# Patient Record
Sex: MALE | Race: AMERICAN INDIAN OR ALASKA NATIVE | URBAN - METROPOLITAN AREA
[De-identification: names, ages, dates, MRNs, and addresses within clinical notes are randomized per-mention and may not be internally consistent; named-entity substitution may affect disease eponyms.]

---

## 2023-08-28 ENCOUNTER — APPOINTMENT (OUTPATIENT)
Dept: RADIOLOGY | Facility: MEDICAL CENTER | Age: 58
End: 2023-08-28
Attending: EMERGENCY MEDICINE
Payer: MEDICARE

## 2023-08-28 ENCOUNTER — HOSPITAL ENCOUNTER (INPATIENT)
Facility: MEDICAL CENTER | Age: 58
LOS: 4 days | DRG: 246 | End: 2023-09-01
Attending: EMERGENCY MEDICINE | Admitting: INTERNAL MEDICINE
Payer: MEDICARE

## 2023-08-28 DIAGNOSIS — Z79.4 TYPE 2 DIABETES MELLITUS WITH HYPERGLYCEMIA, WITH LONG-TERM CURRENT USE OF INSULIN (HCC): ICD-10-CM

## 2023-08-28 DIAGNOSIS — I25.110 CORONARY ARTERY DISEASE INVOLVING NATIVE CORONARY ARTERY OF NATIVE HEART WITH UNSTABLE ANGINA PECTORIS (HCC): ICD-10-CM

## 2023-08-28 DIAGNOSIS — E11.65 TYPE 2 DIABETES MELLITUS WITH HYPERGLYCEMIA, WITH LONG-TERM CURRENT USE OF INSULIN (HCC): ICD-10-CM

## 2023-08-28 DIAGNOSIS — I46.9 CARDIAC ARREST (HCC): ICD-10-CM

## 2023-08-28 DIAGNOSIS — R07.89 CHEST WALL PAIN: ICD-10-CM

## 2023-08-28 DIAGNOSIS — I24.9 ACS (ACUTE CORONARY SYNDROME) (HCC): ICD-10-CM

## 2023-08-28 DIAGNOSIS — I50.20 HFREF (HEART FAILURE WITH REDUCED EJECTION FRACTION) (HCC): ICD-10-CM

## 2023-08-28 PROBLEM — R74.01 TRANSAMINITIS: Status: ACTIVE | Noted: 2023-08-28

## 2023-08-28 PROBLEM — E11.9 DIABETES (HCC): Status: ACTIVE | Noted: 2023-08-28

## 2023-08-28 PROBLEM — J96.90 RESPIRATORY FAILURE REQUIRING INTUBATION (HCC): Status: ACTIVE | Noted: 2023-08-28

## 2023-08-28 PROBLEM — N17.9 AKI (ACUTE KIDNEY INJURY) (HCC): Status: ACTIVE | Noted: 2023-08-28

## 2023-08-28 PROBLEM — E87.5 HYPERKALEMIA: Status: ACTIVE | Noted: 2023-08-28

## 2023-08-28 PROBLEM — E87.20 ACIDOSIS: Status: ACTIVE | Noted: 2023-08-28

## 2023-08-28 LAB
ALBUMIN SERPL BCP-MCNC: 3.4 G/DL (ref 3.2–4.9)
ALBUMIN/GLOB SERPL: 1.1 G/DL
ALP SERPL-CCNC: 89 U/L (ref 30–99)
ALT SERPL-CCNC: 278 U/L (ref 2–50)
ANION GAP SERPL CALC-SCNC: 14 MMOL/L (ref 7–16)
APTT PPP: 92.8 SEC (ref 24.7–36)
AST SERPL-CCNC: 335 U/L (ref 12–45)
BASE EXCESS BLDA CALC-SCNC: -13 MMOL/L (ref -4–3)
BASOPHILS # BLD AUTO: 0.9 % (ref 0–1.8)
BASOPHILS # BLD: 0.26 K/UL (ref 0–0.12)
BILIRUB SERPL-MCNC: 0.2 MG/DL (ref 0.1–1.5)
BODY TEMPERATURE: ABNORMAL DEGREES
BREATHS SETTING VENT: 22
BUN SERPL-MCNC: 25 MG/DL (ref 8–22)
BURR CELLS BLD QL SMEAR: NORMAL
CALCIUM ALBUM COR SERPL-MCNC: 8.3 MG/DL (ref 8.5–10.5)
CALCIUM SERPL-MCNC: 7.8 MG/DL (ref 8.5–10.5)
CHLORIDE SERPL-SCNC: 107 MMOL/L (ref 96–112)
CO2 BLDA-SCNC: 19 MMOL/L (ref 20–33)
CO2 SERPL-SCNC: 12 MMOL/L (ref 20–33)
CREAT SERPL-MCNC: 1.64 MG/DL (ref 0.5–1.4)
DELSYS IDSYS: ABNORMAL
EKG IMPRESSION: NORMAL
EOSINOPHIL # BLD AUTO: 0 K/UL (ref 0–0.51)
EOSINOPHIL NFR BLD: 0 % (ref 0–6.9)
ERYTHROCYTE [DISTWIDTH] IN BLOOD BY AUTOMATED COUNT: 44.6 FL (ref 35.9–50)
GFR SERPLBLD CREATININE-BSD FMLA CKD-EPI: 48 ML/MIN/1.73 M 2
GLOBULIN SER CALC-MCNC: 3 G/DL (ref 1.9–3.5)
GLUCOSE BLD STRIP.AUTO-MCNC: 304 MG/DL (ref 65–99)
GLUCOSE SERPL-MCNC: 313 MG/DL (ref 65–99)
HCO3 BLDA-SCNC: 17.6 MMOL/L (ref 17–25)
HCT VFR BLD AUTO: 52 % (ref 42–52)
HGB BLD-MCNC: 16.5 G/DL (ref 14–18)
HOROWITZ INDEX BLDA+IHG-RTO: 136 MM[HG]
INR PPP: 1.23 (ref 0.87–1.13)
LACTATE BLD-SCNC: 2.8 MMOL/L (ref 0.5–2)
LYMPHOCYTES # BLD AUTO: 1.24 K/UL (ref 1–4.8)
LYMPHOCYTES NFR BLD: 4.3 % (ref 22–41)
MANUAL DIFF BLD: NORMAL
MCH RBC QN AUTO: 30.7 PG (ref 27–33)
MCHC RBC AUTO-ENTMCNC: 31.7 G/DL (ref 32.3–36.5)
MCV RBC AUTO: 96.7 FL (ref 81.4–97.8)
MODE IMODE: ABNORMAL
MONOCYTES # BLD AUTO: 0.98 K/UL (ref 0–0.85)
MONOCYTES NFR BLD AUTO: 3.4 % (ref 0–13.4)
MORPHOLOGY BLD-IMP: NORMAL
NEUTROPHILS # BLD AUTO: 26.32 K/UL (ref 1.82–7.42)
NEUTROPHILS NFR BLD: 91.4 % (ref 44–72)
NRBC # BLD AUTO: 0 K/UL
NRBC BLD-RTO: 0 /100 WBC (ref 0–0.2)
O2/TOTAL GAS SETTING VFR VENT: 100 %
PCO2 BLDA: 58.6 MMHG (ref 26–37)
PEEP END EXPIRATORY PRESSURE IPEEP: 8 CMH20
PH BLDA: 7.08 [PH] (ref 7.4–7.5)
PLATELET # BLD AUTO: 400 K/UL (ref 164–446)
PLATELET BLD QL SMEAR: NORMAL
PMV BLD AUTO: 9.6 FL (ref 9–12.9)
PO2 BLDA: 136 MMHG (ref 64–87)
POIKILOCYTOSIS BLD QL SMEAR: NORMAL
POTASSIUM SERPL-SCNC: 7.5 MMOL/L (ref 3.6–5.5)
PROT SERPL-MCNC: 6.4 G/DL (ref 6–8.2)
PROTHROMBIN TIME: 15.7 SEC (ref 12–14.6)
RBC # BLD AUTO: 5.38 M/UL (ref 4.7–6.1)
RBC BLD AUTO: PRESENT
SAO2 % BLDA: 98 % (ref 93–99)
SODIUM SERPL-SCNC: 133 MMOL/L (ref 135–145)
SPECIMEN DRAWN FROM PATIENT: ABNORMAL
TIDAL VOLUME IVT: 490 ML
TROPONIN T SERPL-MCNC: 2564 NG/L (ref 6–19)
WBC # BLD AUTO: 28.8 K/UL (ref 4.8–10.8)
WBC TOXIC VACUOLES BLD QL SMEAR: NORMAL

## 2023-08-28 PROCEDURE — 96375 TX/PRO/DX INJ NEW DRUG ADDON: CPT

## 2023-08-28 PROCEDURE — 36600 WITHDRAWAL OF ARTERIAL BLOOD: CPT

## 2023-08-28 PROCEDURE — 85610 PROTHROMBIN TIME: CPT | Mod: 91

## 2023-08-28 PROCEDURE — 82962 GLUCOSE BLOOD TEST: CPT | Mod: 91

## 2023-08-28 PROCEDURE — 96365 THER/PROPH/DIAG IV INF INIT: CPT

## 2023-08-28 PROCEDURE — 700102 HCHG RX REV CODE 250 W/ 637 OVERRIDE(OP): Performed by: EMERGENCY MEDICINE

## 2023-08-28 PROCEDURE — 02HV33Z INSERTION OF INFUSION DEVICE INTO SUPERIOR VENA CAVA, PERCUTANEOUS APPROACH: ICD-10-PCS | Performed by: INTERNAL MEDICINE

## 2023-08-28 PROCEDURE — 700101 HCHG RX REV CODE 250: Performed by: EMERGENCY MEDICINE

## 2023-08-28 PROCEDURE — 700101 HCHG RX REV CODE 250: Performed by: INTERNAL MEDICINE

## 2023-08-28 PROCEDURE — 85730 THROMBOPLASTIN TIME PARTIAL: CPT

## 2023-08-28 PROCEDURE — 700111 HCHG RX REV CODE 636 W/ 250 OVERRIDE (IP): Mod: JZ | Performed by: EMERGENCY MEDICINE

## 2023-08-28 PROCEDURE — 94003 VENT MGMT INPAT SUBQ DAY: CPT

## 2023-08-28 PROCEDURE — 99223 1ST HOSP IP/OBS HIGH 75: CPT | Performed by: INTERNAL MEDICINE

## 2023-08-28 PROCEDURE — 770022 HCHG ROOM/CARE - ICU (200)

## 2023-08-28 PROCEDURE — 99291 CRITICAL CARE FIRST HOUR: CPT

## 2023-08-28 PROCEDURE — 99291 CRITICAL CARE FIRST HOUR: CPT | Mod: GC | Performed by: INTERNAL MEDICINE

## 2023-08-28 PROCEDURE — 85007 BL SMEAR W/DIFF WBC COUNT: CPT

## 2023-08-28 PROCEDURE — 700105 HCHG RX REV CODE 258: Performed by: INTERNAL MEDICINE

## 2023-08-28 PROCEDURE — 85025 COMPLETE CBC W/AUTO DIFF WBC: CPT

## 2023-08-28 PROCEDURE — 84484 ASSAY OF TROPONIN QUANT: CPT

## 2023-08-28 PROCEDURE — 93005 ELECTROCARDIOGRAM TRACING: CPT | Performed by: EMERGENCY MEDICINE

## 2023-08-28 PROCEDURE — 5A1935Z RESPIRATORY VENTILATION, LESS THAN 24 CONSECUTIVE HOURS: ICD-10-PCS | Performed by: INTERNAL MEDICINE

## 2023-08-28 PROCEDURE — 80053 COMPREHEN METABOLIC PANEL: CPT

## 2023-08-28 PROCEDURE — 96366 THER/PROPH/DIAG IV INF ADDON: CPT

## 2023-08-28 PROCEDURE — 36556 INSERT NON-TUNNEL CV CATH: CPT

## 2023-08-28 PROCEDURE — 85520 HEPARIN ASSAY: CPT

## 2023-08-28 PROCEDURE — 71045 X-RAY EXAM CHEST 1 VIEW: CPT

## 2023-08-28 PROCEDURE — 36415 COLL VENOUS BLD VENIPUNCTURE: CPT

## 2023-08-28 PROCEDURE — 83605 ASSAY OF LACTIC ACID: CPT

## 2023-08-28 PROCEDURE — C1751 CATH, INF, PER/CENT/MIDLINE: HCPCS

## 2023-08-28 PROCEDURE — 700111 HCHG RX REV CODE 636 W/ 250 OVERRIDE (IP): Mod: JZ | Performed by: INTERNAL MEDICINE

## 2023-08-28 PROCEDURE — 700105 HCHG RX REV CODE 258: Performed by: EMERGENCY MEDICINE

## 2023-08-28 PROCEDURE — 96368 THER/DIAG CONCURRENT INF: CPT

## 2023-08-28 PROCEDURE — 94002 VENT MGMT INPAT INIT DAY: CPT

## 2023-08-28 PROCEDURE — 82803 BLOOD GASES ANY COMBINATION: CPT

## 2023-08-28 RX ORDER — HEPARIN SODIUM 1000 [USP'U]/ML
2000 INJECTION, SOLUTION INTRAVENOUS; SUBCUTANEOUS PRN
Status: DISCONTINUED | OUTPATIENT
Start: 2023-08-28 | End: 2023-08-29

## 2023-08-28 RX ORDER — FAMOTIDINE 20 MG/1
20 TABLET, FILM COATED ORAL EVERY 12 HOURS
Status: DISCONTINUED | OUTPATIENT
Start: 2023-08-28 | End: 2023-08-29

## 2023-08-28 RX ORDER — DEXMEDETOMIDINE HYDROCHLORIDE 4 UG/ML
0-1.5 INJECTION, SOLUTION INTRAVENOUS CONTINUOUS
Status: DISCONTINUED | OUTPATIENT
Start: 2023-08-28 | End: 2023-08-29

## 2023-08-28 RX ORDER — NOREPINEPHRINE BITARTRATE 0.03 MG/ML
0-1 INJECTION, SOLUTION INTRAVENOUS CONTINUOUS
Status: DISCONTINUED | OUTPATIENT
Start: 2023-08-28 | End: 2023-08-30

## 2023-08-28 RX ORDER — SODIUM CHLORIDE, SODIUM LACTATE, POTASSIUM CHLORIDE, AND CALCIUM CHLORIDE .6; .31; .03; .02 G/100ML; G/100ML; G/100ML; G/100ML
1000 INJECTION, SOLUTION INTRAVENOUS ONCE
Status: COMPLETED | OUTPATIENT
Start: 2023-08-28 | End: 2023-08-29

## 2023-08-28 RX ORDER — AMOXICILLIN 250 MG
2 CAPSULE ORAL 2 TIMES DAILY
Status: DISCONTINUED | OUTPATIENT
Start: 2023-08-28 | End: 2023-09-01 | Stop reason: HOSPADM

## 2023-08-28 RX ORDER — INSULIN LISPRO 100 [IU]/ML
1-6 INJECTION, SOLUTION INTRAVENOUS; SUBCUTANEOUS EVERY 6 HOURS
Status: DISCONTINUED | OUTPATIENT
Start: 2023-08-29 | End: 2023-08-29

## 2023-08-28 RX ORDER — HEPARIN SODIUM 5000 [USP'U]/100ML
0-30 INJECTION, SOLUTION INTRAVENOUS CONTINUOUS
Status: DISCONTINUED | OUTPATIENT
Start: 2023-08-28 | End: 2023-08-29

## 2023-08-28 RX ORDER — CALCIUM CHLORIDE 100 MG/ML
1 INJECTION INTRAVENOUS; INTRAVENTRICULAR ONCE
Status: COMPLETED | OUTPATIENT
Start: 2023-08-28 | End: 2023-08-28

## 2023-08-28 RX ORDER — HEPARIN SODIUM 1000 [USP'U]/ML
4000 INJECTION, SOLUTION INTRAVENOUS; SUBCUTANEOUS ONCE
Status: COMPLETED | OUTPATIENT
Start: 2023-08-28 | End: 2023-08-28

## 2023-08-28 RX ORDER — DEXTROSE MONOHYDRATE 50 MG/ML
INJECTION, SOLUTION INTRAVENOUS CONTINUOUS
Status: DISCONTINUED | OUTPATIENT
Start: 2023-08-28 | End: 2023-08-28

## 2023-08-28 RX ORDER — IPRATROPIUM BROMIDE AND ALBUTEROL SULFATE 2.5; .5 MG/3ML; MG/3ML
3 SOLUTION RESPIRATORY (INHALATION)
Status: DISCONTINUED | OUTPATIENT
Start: 2023-08-28 | End: 2023-09-01 | Stop reason: HOSPADM

## 2023-08-28 RX ORDER — BISACODYL 10 MG
10 SUPPOSITORY, RECTAL RECTAL
Status: DISCONTINUED | OUTPATIENT
Start: 2023-08-28 | End: 2023-09-01 | Stop reason: HOSPADM

## 2023-08-28 RX ORDER — DEXTROSE MONOHYDRATE 25 G/50ML
12.5 INJECTION, SOLUTION INTRAVENOUS ONCE
Status: COMPLETED | OUTPATIENT
Start: 2023-08-28 | End: 2023-08-28

## 2023-08-28 RX ORDER — POLYETHYLENE GLYCOL 3350 17 G/17G
1 POWDER, FOR SOLUTION ORAL
Status: DISCONTINUED | OUTPATIENT
Start: 2023-08-28 | End: 2023-09-01 | Stop reason: HOSPADM

## 2023-08-28 RX ADMIN — DEXTROSE MONOHYDRATE 12.5 G: 25 INJECTION, SOLUTION INTRAVENOUS at 22:32

## 2023-08-28 RX ADMIN — NOREPINEPHRINE BITARTRATE 0.05 MCG/KG/MIN: 1 INJECTION, SOLUTION, CONCENTRATE INTRAVENOUS at 19:31

## 2023-08-28 RX ADMIN — HEPARIN SODIUM 4000 UNITS: 1000 INJECTION, SOLUTION INTRAVENOUS; SUBCUTANEOUS at 23:36

## 2023-08-28 RX ADMIN — CALCIUM CHLORIDE 1 G: 100 INJECTION INTRAVENOUS; INTRAVENTRICULAR at 22:32

## 2023-08-28 RX ADMIN — HEPARIN SODIUM 12 UNITS/KG/HR: 5000 INJECTION, SOLUTION INTRAVENOUS at 23:38

## 2023-08-28 RX ADMIN — INSULIN HUMAN 10.5 UNITS: 100 INJECTION, SOLUTION PARENTERAL at 22:40

## 2023-08-28 RX ADMIN — DEXMEDETOMIDINE 0.2 MCG/KG/HR: 100 INJECTION, SOLUTION INTRAVENOUS at 23:24

## 2023-08-28 RX ADMIN — FAMOTIDINE 20 MG: 10 INJECTION, SOLUTION INTRAVENOUS at 23:58

## 2023-08-28 RX ADMIN — FENTANYL CITRATE 50 MCG: 50 INJECTION, SOLUTION INTRAMUSCULAR; INTRAVENOUS at 23:05

## 2023-08-28 RX ADMIN — SODIUM CHLORIDE, POTASSIUM CHLORIDE, SODIUM LACTATE AND CALCIUM CHLORIDE 1000 ML: 600; 310; 30; 20 INJECTION, SOLUTION INTRAVENOUS at 23:55

## 2023-08-28 RX ADMIN — AMIODARONE HYDROCHLORIDE 1 MG/MIN: 1.8 INJECTION, SOLUTION INTRAVENOUS at 19:31

## 2023-08-28 RX ADMIN — SODIUM BICARBONATE 100 MEQ: 84 INJECTION INTRAVENOUS at 20:16

## 2023-08-28 ASSESSMENT — PAIN DESCRIPTION - PAIN TYPE
TYPE: ACUTE PAIN

## 2023-08-28 ASSESSMENT — FIBROSIS 4 INDEX: FIB4 SCORE: 2.91

## 2023-08-29 ENCOUNTER — APPOINTMENT (OUTPATIENT)
Dept: CARDIOLOGY | Facility: MEDICAL CENTER | Age: 58
End: 2023-08-29
Attending: PHYSICIAN ASSISTANT
Payer: MEDICARE

## 2023-08-29 ENCOUNTER — APPOINTMENT (OUTPATIENT)
Dept: RADIOLOGY | Facility: MEDICAL CENTER | Age: 58
End: 2023-08-29
Attending: INTERNAL MEDICINE
Payer: MEDICARE

## 2023-08-29 ENCOUNTER — HOSPITAL ENCOUNTER (OUTPATIENT)
Dept: RADIOLOGY | Facility: MEDICAL CENTER | Age: 58
End: 2023-08-29
Attending: INTERNAL MEDICINE

## 2023-08-29 PROBLEM — E87.29 HIGH ANION GAP METABOLIC ACIDOSIS: Status: ACTIVE | Noted: 2023-08-28

## 2023-08-29 PROBLEM — I24.9 ACS (ACUTE CORONARY SYNDROME) (HCC): Status: ACTIVE | Noted: 2023-08-29

## 2023-08-29 PROBLEM — I50.20 HFREF (HEART FAILURE WITH REDUCED EJECTION FRACTION) (HCC): Status: ACTIVE | Noted: 2023-08-29

## 2023-08-29 PROBLEM — I49.01 CARDIAC ARREST WITH VENTRICULAR FIBRILLATION (HCC): Status: ACTIVE | Noted: 2023-08-28

## 2023-08-29 PROBLEM — E11.65 TYPE 2 DIABETES MELLITUS WITH HYPERGLYCEMIA, WITHOUT LONG-TERM CURRENT USE OF INSULIN (HCC): Status: ACTIVE | Noted: 2023-08-28

## 2023-08-29 LAB
ACT BLD: 245 SEC (ref 74–137)
ACT BLD: 281 SEC (ref 74–137)
ALBUMIN SERPL BCP-MCNC: 3.1 G/DL (ref 3.2–4.9)
ALBUMIN/GLOB SERPL: 1.3 G/DL
ALP SERPL-CCNC: 69 U/L (ref 30–99)
ALT SERPL-CCNC: 198 U/L (ref 2–50)
ANION GAP SERPL CALC-SCNC: 13 MMOL/L (ref 7–16)
ANION GAP SERPL CALC-SCNC: 13 MMOL/L (ref 7–16)
ANION GAP SERPL CALC-SCNC: 15 MMOL/L (ref 7–16)
APTT PPP: 29.9 SEC (ref 24.7–36)
AST SERPL-CCNC: 232 U/L (ref 12–45)
BASE EXCESS BLDA CALC-SCNC: -10 MMOL/L (ref -4–3)
BASOPHILS # BLD AUTO: 0.1 % (ref 0–1.8)
BASOPHILS # BLD: 0.02 K/UL (ref 0–0.12)
BILIRUB SERPL-MCNC: 0.2 MG/DL (ref 0.1–1.5)
BODY TEMPERATURE: ABNORMAL DEGREES
BREATHS SETTING VENT: 28
BUN SERPL-MCNC: 28 MG/DL (ref 8–22)
BUN SERPL-MCNC: 32 MG/DL (ref 8–22)
BUN SERPL-MCNC: 33 MG/DL (ref 8–22)
CALCIUM ALBUM COR SERPL-MCNC: 9.6 MG/DL (ref 8.5–10.5)
CALCIUM SERPL-MCNC: 8.5 MG/DL (ref 8.5–10.5)
CALCIUM SERPL-MCNC: 8.8 MG/DL (ref 8.5–10.5)
CALCIUM SERPL-MCNC: 8.9 MG/DL (ref 8.5–10.5)
CHLORIDE SERPL-SCNC: 106 MMOL/L (ref 96–112)
CHLORIDE SERPL-SCNC: 108 MMOL/L (ref 96–112)
CHLORIDE SERPL-SCNC: 108 MMOL/L (ref 96–112)
CO2 BLDA-SCNC: 18 MMOL/L (ref 20–33)
CO2 SERPL-SCNC: 16 MMOL/L (ref 20–33)
CO2 SERPL-SCNC: 16 MMOL/L (ref 20–33)
CO2 SERPL-SCNC: 19 MMOL/L (ref 20–33)
CREAT SERPL-MCNC: 1.84 MG/DL (ref 0.5–1.4)
CREAT SERPL-MCNC: 2.11 MG/DL (ref 0.5–1.4)
CREAT SERPL-MCNC: 2.29 MG/DL (ref 0.5–1.4)
DELSYS IDSYS: ABNORMAL
EKG IMPRESSION: NORMAL
END TIDAL CARBON DIOXIDE IECO2: 26 MMHG
EOSINOPHIL # BLD AUTO: 0 K/UL (ref 0–0.51)
EOSINOPHIL NFR BLD: 0 % (ref 0–6.9)
ERYTHROCYTE [DISTWIDTH] IN BLOOD BY AUTOMATED COUNT: 43.9 FL (ref 35.9–50)
EST. AVERAGE GLUCOSE BLD GHB EST-MCNC: 212 MG/DL
GFR SERPLBLD CREATININE-BSD FMLA CKD-EPI: 32 ML/MIN/1.73 M 2
GFR SERPLBLD CREATININE-BSD FMLA CKD-EPI: 36 ML/MIN/1.73 M 2
GFR SERPLBLD CREATININE-BSD FMLA CKD-EPI: 42 ML/MIN/1.73 M 2
GLOBULIN SER CALC-MCNC: 2.4 G/DL (ref 1.9–3.5)
GLUCOSE BLD STRIP.AUTO-MCNC: 142 MG/DL (ref 65–99)
GLUCOSE BLD STRIP.AUTO-MCNC: 168 MG/DL (ref 65–99)
GLUCOSE BLD STRIP.AUTO-MCNC: 223 MG/DL (ref 65–99)
GLUCOSE BLD STRIP.AUTO-MCNC: 257 MG/DL (ref 65–99)
GLUCOSE BLD STRIP.AUTO-MCNC: 275 MG/DL (ref 65–99)
GLUCOSE BLD STRIP.AUTO-MCNC: 286 MG/DL (ref 65–99)
GLUCOSE BLD STRIP.AUTO-MCNC: 330 MG/DL (ref 65–99)
GLUCOSE SERPL-MCNC: 259 MG/DL (ref 65–99)
GLUCOSE SERPL-MCNC: 281 MG/DL (ref 65–99)
GLUCOSE SERPL-MCNC: 292 MG/DL (ref 65–99)
HBA1C MFR BLD: 9 % (ref 4–5.6)
HCO3 BLDA-SCNC: 17.3 MMOL/L (ref 17–25)
HCT VFR BLD AUTO: 43.2 % (ref 42–52)
HGB BLD-MCNC: 14.6 G/DL (ref 14–18)
HOROWITZ INDEX BLDA+IHG-RTO: 334 MM[HG]
IMM GRANULOCYTES # BLD AUTO: 0.14 K/UL (ref 0–0.11)
IMM GRANULOCYTES NFR BLD AUTO: 0.9 % (ref 0–0.9)
INR PPP: 1.26 (ref 0.87–1.13)
LACTATE SERPL-SCNC: 1.4 MMOL/L (ref 0.5–2)
LACTATE SERPL-SCNC: 1.6 MMOL/L (ref 0.5–2)
LACTATE SERPL-SCNC: 2.2 MMOL/L (ref 0.5–2)
LACTATE SERPL-SCNC: 5.2 MMOL/L (ref 0.5–2)
LV EJECT FRACT  99904: 42
LV EJECT FRACT MOD 2C 99903: 36.98
LV EJECT FRACT MOD 4C 99902: 47.93
LV EJECT FRACT MOD BP 99901: 42.44
LYMPHOCYTES # BLD AUTO: 1.34 K/UL (ref 1–4.8)
LYMPHOCYTES NFR BLD: 8.2 % (ref 22–41)
MAGNESIUM SERPL-MCNC: 1.5 MG/DL (ref 1.5–2.5)
MCH RBC QN AUTO: 31.2 PG (ref 27–33)
MCHC RBC AUTO-ENTMCNC: 33.8 G/DL (ref 32.3–36.5)
MCV RBC AUTO: 92.3 FL (ref 81.4–97.8)
MODE IMODE: ABNORMAL
MONOCYTES # BLD AUTO: 1.06 K/UL (ref 0–0.85)
MONOCYTES NFR BLD AUTO: 6.4 % (ref 0–13.4)
NEUTROPHILS # BLD AUTO: 13.88 K/UL (ref 1.82–7.42)
NEUTROPHILS NFR BLD: 84.4 % (ref 44–72)
NRBC # BLD AUTO: 0 K/UL
NRBC BLD-RTO: 0 /100 WBC (ref 0–0.2)
O2/TOTAL GAS SETTING VFR VENT: 80 %
PCO2 BLDA: 39.7 MMHG (ref 26–37)
PCO2 TEMP ADJ BLDA: 41.1 MMHG (ref 26–37)
PEEP END EXPIRATORY PRESSURE IPEEP: 8 CMH20
PH BLDA: 7.25 [PH] (ref 7.4–7.5)
PH TEMP ADJ BLDA: 7.24 [PH] (ref 7.4–7.5)
PHOSPHATE SERPL-MCNC: 3.7 MG/DL (ref 2.5–4.5)
PLATELET # BLD AUTO: 315 K/UL (ref 164–446)
PMV BLD AUTO: 9.8 FL (ref 9–12.9)
PO2 BLDA: 267 MMHG (ref 64–87)
PO2 TEMP ADJ BLDA: 271 MMHG (ref 64–87)
POTASSIUM SERPL-SCNC: 5.1 MMOL/L (ref 3.6–5.5)
POTASSIUM SERPL-SCNC: 5.3 MMOL/L (ref 3.6–5.5)
POTASSIUM SERPL-SCNC: 6.6 MMOL/L (ref 3.6–5.5)
PROT SERPL-MCNC: 5.5 G/DL (ref 6–8.2)
PROTHROMBIN TIME: 15.9 SEC (ref 12–14.6)
RBC # BLD AUTO: 4.68 M/UL (ref 4.7–6.1)
SAO2 % BLDA: 100 % (ref 93–99)
SODIUM SERPL-SCNC: 137 MMOL/L (ref 135–145)
SODIUM SERPL-SCNC: 137 MMOL/L (ref 135–145)
SODIUM SERPL-SCNC: 140 MMOL/L (ref 135–145)
SPECIMEN DRAWN FROM PATIENT: ABNORMAL
TIDAL VOLUME IVT: 570 ML
TROPONIN T SERPL-MCNC: 2763 NG/L (ref 6–19)
TROPONIN T SERPL-MCNC: 2817 NG/L (ref 6–19)
TROPONIN T SERPL-MCNC: 3401 NG/L (ref 6–19)
TROPONIN T SERPL-MCNC: 3505 NG/L (ref 6–19)
UFH PPP CHRO-ACNC: 0.41 IU/ML
UFH PPP CHRO-ACNC: 0.54 IU/ML
UFH PPP CHRO-ACNC: <0.1 IU/ML
WBC # BLD AUTO: 16.4 K/UL (ref 4.8–10.8)

## 2023-08-29 PROCEDURE — 93005 ELECTROCARDIOGRAM TRACING: CPT | Performed by: STUDENT IN AN ORGANIZED HEALTH CARE EDUCATION/TRAINING PROGRAM

## 2023-08-29 PROCEDURE — A9270 NON-COVERED ITEM OR SERVICE: HCPCS

## 2023-08-29 PROCEDURE — 83036 HEMOGLOBIN GLYCOSYLATED A1C: CPT

## 2023-08-29 PROCEDURE — 99291 CRITICAL CARE FIRST HOUR: CPT | Performed by: STUDENT IN AN ORGANIZED HEALTH CARE EDUCATION/TRAINING PROGRAM

## 2023-08-29 PROCEDURE — 93010 ELECTROCARDIOGRAM REPORT: CPT | Performed by: INTERNAL MEDICINE

## 2023-08-29 PROCEDURE — 700101 HCHG RX REV CODE 250: Performed by: STUDENT IN AN ORGANIZED HEALTH CARE EDUCATION/TRAINING PROGRAM

## 2023-08-29 PROCEDURE — 700111 HCHG RX REV CODE 636 W/ 250 OVERRIDE (IP): Mod: JZ | Performed by: INTERNAL MEDICINE

## 2023-08-29 PROCEDURE — 36600 WITHDRAWAL OF ARTERIAL BLOOD: CPT

## 2023-08-29 PROCEDURE — 92928 PRQ TCAT PLMT NTRAC ST 1 LES: CPT | Mod: RC | Performed by: INTERNAL MEDICINE

## 2023-08-29 PROCEDURE — 700102 HCHG RX REV CODE 250 W/ 637 OVERRIDE(OP)

## 2023-08-29 PROCEDURE — 93306 TTE W/DOPPLER COMPLETE: CPT | Mod: 26 | Performed by: INTERNAL MEDICINE

## 2023-08-29 PROCEDURE — 85025 COMPLETE CBC W/AUTO DIFF WBC: CPT

## 2023-08-29 PROCEDURE — 99152 MOD SED SAME PHYS/QHP 5/>YRS: CPT | Performed by: INTERNAL MEDICINE

## 2023-08-29 PROCEDURE — A9270 NON-COVERED ITEM OR SERVICE: HCPCS | Performed by: STUDENT IN AN ORGANIZED HEALTH CARE EDUCATION/TRAINING PROGRAM

## 2023-08-29 PROCEDURE — 700101 HCHG RX REV CODE 250: Performed by: EMERGENCY MEDICINE

## 2023-08-29 PROCEDURE — 700117 HCHG RX CONTRAST REV CODE 255: Performed by: INTERNAL MEDICINE

## 2023-08-29 PROCEDURE — 85347 COAGULATION TIME ACTIVATED: CPT | Mod: 91

## 2023-08-29 PROCEDURE — 83735 ASSAY OF MAGNESIUM: CPT

## 2023-08-29 PROCEDURE — 92610 EVALUATE SWALLOWING FUNCTION: CPT

## 2023-08-29 PROCEDURE — 84484 ASSAY OF TROPONIN QUANT: CPT | Mod: 91

## 2023-08-29 PROCEDURE — 94003 VENT MGMT INPAT SUBQ DAY: CPT

## 2023-08-29 PROCEDURE — 99153 MOD SED SAME PHYS/QHP EA: CPT

## 2023-08-29 PROCEDURE — 71045 X-RAY EXAM CHEST 1 VIEW: CPT

## 2023-08-29 PROCEDURE — 700111 HCHG RX REV CODE 636 W/ 250 OVERRIDE (IP): Mod: JZ | Performed by: EMERGENCY MEDICINE

## 2023-08-29 PROCEDURE — 82803 BLOOD GASES ANY COMBINATION: CPT

## 2023-08-29 PROCEDURE — B2111ZZ FLUOROSCOPY OF MULTIPLE CORONARY ARTERIES USING LOW OSMOLAR CONTRAST: ICD-10-PCS | Performed by: INTERNAL MEDICINE

## 2023-08-29 PROCEDURE — 700101 HCHG RX REV CODE 250

## 2023-08-29 PROCEDURE — 84100 ASSAY OF PHOSPHORUS: CPT

## 2023-08-29 PROCEDURE — 700101 HCHG RX REV CODE 250: Performed by: INTERNAL MEDICINE

## 2023-08-29 PROCEDURE — 770022 HCHG ROOM/CARE - ICU (200)

## 2023-08-29 PROCEDURE — 93306 TTE W/DOPPLER COMPLETE: CPT

## 2023-08-29 PROCEDURE — 82962 GLUCOSE BLOOD TEST: CPT | Mod: 91

## 2023-08-29 PROCEDURE — 80053 COMPREHEN METABOLIC PANEL: CPT

## 2023-08-29 PROCEDURE — 93005 ELECTROCARDIOGRAM TRACING: CPT | Performed by: INTERNAL MEDICINE

## 2023-08-29 PROCEDURE — 027236Z DILATION OF CORONARY ARTERY, THREE ARTERIES WITH THREE DRUG-ELUTING INTRALUMINAL DEVICES, PERCUTANEOUS APPROACH: ICD-10-PCS | Performed by: INTERNAL MEDICINE

## 2023-08-29 PROCEDURE — 700102 HCHG RX REV CODE 250 W/ 637 OVERRIDE(OP): Performed by: STUDENT IN AN ORGANIZED HEALTH CARE EDUCATION/TRAINING PROGRAM

## 2023-08-29 PROCEDURE — 700111 HCHG RX REV CODE 636 W/ 250 OVERRIDE (IP)

## 2023-08-29 PROCEDURE — 700105 HCHG RX REV CODE 258: Performed by: INTERNAL MEDICINE

## 2023-08-29 PROCEDURE — 700105 HCHG RX REV CODE 258: Performed by: EMERGENCY MEDICINE

## 2023-08-29 PROCEDURE — 99233 SBSQ HOSP IP/OBS HIGH 50: CPT | Mod: 25 | Performed by: INTERNAL MEDICINE

## 2023-08-29 PROCEDURE — 4A023N7 MEASUREMENT OF CARDIAC SAMPLING AND PRESSURE, LEFT HEART, PERCUTANEOUS APPROACH: ICD-10-PCS | Performed by: INTERNAL MEDICINE

## 2023-08-29 PROCEDURE — 85520 HEPARIN ASSAY: CPT

## 2023-08-29 PROCEDURE — 93458 L HRT ARTERY/VENTRICLE ANGIO: CPT | Mod: 26,59 | Performed by: INTERNAL MEDICINE

## 2023-08-29 PROCEDURE — 700111 HCHG RX REV CODE 636 W/ 250 OVERRIDE (IP): Mod: JZ

## 2023-08-29 PROCEDURE — 80048 BASIC METABOLIC PNL TOTAL CA: CPT | Mod: 91

## 2023-08-29 PROCEDURE — 700117 HCHG RX CONTRAST REV CODE 255: Performed by: PHYSICIAN ASSISTANT

## 2023-08-29 PROCEDURE — 83605 ASSAY OF LACTIC ACID: CPT | Mod: 91

## 2023-08-29 RX ORDER — ALBUTEROL SULFATE 90 UG/1
1-2 AEROSOL, METERED RESPIRATORY (INHALATION) EVERY 4 HOURS PRN
COMMUNITY

## 2023-08-29 RX ORDER — DEXTROSE MONOHYDRATE 25 G/50ML
25 INJECTION, SOLUTION INTRAVENOUS
Status: DISCONTINUED | OUTPATIENT
Start: 2023-08-29 | End: 2023-09-01 | Stop reason: HOSPADM

## 2023-08-29 RX ORDER — PRASUGREL 10 MG/1
10 TABLET, FILM COATED ORAL DAILY
Status: DISCONTINUED | OUTPATIENT
Start: 2023-08-30 | End: 2023-09-01 | Stop reason: HOSPADM

## 2023-08-29 RX ORDER — MEPERIDINE HYDROCHLORIDE 50 MG/ML
25 INJECTION INTRAMUSCULAR; INTRAVENOUS; SUBCUTANEOUS
Status: DISCONTINUED | OUTPATIENT
Start: 2023-08-29 | End: 2023-08-29

## 2023-08-29 RX ORDER — HEPARIN SODIUM 1000 [USP'U]/ML
INJECTION, SOLUTION INTRAVENOUS; SUBCUTANEOUS
Status: COMPLETED
Start: 2023-08-29 | End: 2023-08-29

## 2023-08-29 RX ORDER — VERAPAMIL HYDROCHLORIDE 2.5 MG/ML
INJECTION, SOLUTION INTRAVENOUS
Status: COMPLETED
Start: 2023-08-29 | End: 2023-08-29

## 2023-08-29 RX ORDER — GABAPENTIN 300 MG/1
900 CAPSULE ORAL 2 TIMES DAILY
COMMUNITY

## 2023-08-29 RX ORDER — MAGNESIUM SULFATE HEPTAHYDRATE 40 MG/ML
.5-2 INJECTION, SOLUTION INTRAVENOUS CONTINUOUS
Status: DISCONTINUED | OUTPATIENT
Start: 2023-08-29 | End: 2023-08-29

## 2023-08-29 RX ORDER — HEPARIN SODIUM 200 [USP'U]/100ML
INJECTION, SOLUTION INTRAVENOUS
Status: COMPLETED
Start: 2023-08-29 | End: 2023-08-29

## 2023-08-29 RX ORDER — DEXTROSE MONOHYDRATE 25 G/50ML
25 INJECTION, SOLUTION INTRAVENOUS
Status: DISCONTINUED | OUTPATIENT
Start: 2023-08-29 | End: 2023-08-29

## 2023-08-29 RX ORDER — ASPIRIN 81 MG/1
81 TABLET, CHEWABLE ORAL DAILY
Status: DISCONTINUED | OUTPATIENT
Start: 2023-08-30 | End: 2023-09-01 | Stop reason: HOSPADM

## 2023-08-29 RX ORDER — OXYCODONE HYDROCHLORIDE 5 MG/1
5 TABLET ORAL EVERY 4 HOURS PRN
Status: DISCONTINUED | OUTPATIENT
Start: 2023-08-29 | End: 2023-08-30

## 2023-08-29 RX ORDER — HEPARIN SODIUM 200 [USP'U]/100ML
INJECTION, SOLUTION INTRAVENOUS
Status: DISCONTINUED
Start: 2023-08-29 | End: 2023-08-30 | Stop reason: HOSPADM

## 2023-08-29 RX ORDER — MIDAZOLAM HYDROCHLORIDE 1 MG/ML
INJECTION INTRAMUSCULAR; INTRAVENOUS
Status: COMPLETED
Start: 2023-08-29 | End: 2023-08-29

## 2023-08-29 RX ORDER — LIDOCAINE HYDROCHLORIDE 20 MG/ML
INJECTION, SOLUTION INFILTRATION; PERINEURAL
Status: COMPLETED
Start: 2023-08-29 | End: 2023-08-29

## 2023-08-29 RX ORDER — PRASUGREL 10 MG/1
60 TABLET, FILM COATED ORAL ONCE
Status: DISCONTINUED | OUTPATIENT
Start: 2023-08-29 | End: 2023-08-29

## 2023-08-29 RX ORDER — HEPARIN SODIUM 1000 [USP'U]/ML
INJECTION, SOLUTION INTRAVENOUS; SUBCUTANEOUS
Status: DISCONTINUED
Start: 2023-08-29 | End: 2023-08-30 | Stop reason: HOSPADM

## 2023-08-29 RX ORDER — LIDOCAINE 50 MG/G
1 PATCH TOPICAL EVERY 24 HOURS
Status: DISCONTINUED | OUTPATIENT
Start: 2023-08-29 | End: 2023-09-01 | Stop reason: HOSPADM

## 2023-08-29 RX ORDER — GUAIFENESIN 200 MG/10ML
10 LIQUID ORAL EVERY 4 HOURS PRN
Status: DISCONTINUED | OUTPATIENT
Start: 2023-08-29 | End: 2023-09-01 | Stop reason: HOSPADM

## 2023-08-29 RX ORDER — PRASUGREL 10 MG/1
TABLET, FILM COATED ORAL
Status: COMPLETED
Start: 2023-08-29 | End: 2023-08-29

## 2023-08-29 RX ORDER — CALCIUM GLUCONATE 20 MG/ML
2 INJECTION, SOLUTION INTRAVENOUS ONCE
Status: COMPLETED | OUTPATIENT
Start: 2023-08-29 | End: 2023-08-29

## 2023-08-29 RX ORDER — SODIUM CHLORIDE 9 MG/ML
INJECTION, SOLUTION INTRAVENOUS CONTINUOUS
Status: ACTIVE | OUTPATIENT
Start: 2023-08-29 | End: 2023-08-30

## 2023-08-29 RX ORDER — DULOXETIN HYDROCHLORIDE 60 MG/1
60 CAPSULE, DELAYED RELEASE ORAL DAILY
COMMUNITY

## 2023-08-29 RX ORDER — DEXTROSE MONOHYDRATE 25 G/50ML
12.5 INJECTION, SOLUTION INTRAVENOUS ONCE
Status: COMPLETED | OUTPATIENT
Start: 2023-08-29 | End: 2023-08-29

## 2023-08-29 RX ORDER — ACETAMINOPHEN 650 MG/1
650 SUPPOSITORY RECTAL EVERY 6 HOURS
Status: DISCONTINUED | OUTPATIENT
Start: 2023-08-29 | End: 2023-08-30

## 2023-08-29 RX ORDER — HYDROMORPHONE HYDROCHLORIDE 1 MG/ML
0.25 INJECTION, SOLUTION INTRAMUSCULAR; INTRAVENOUS; SUBCUTANEOUS ONCE
Status: COMPLETED | OUTPATIENT
Start: 2023-08-29 | End: 2023-08-29

## 2023-08-29 RX ORDER — ACETAMINOPHEN 325 MG/1
650 TABLET ORAL EVERY 6 HOURS
Status: DISCONTINUED | OUTPATIENT
Start: 2023-08-29 | End: 2023-08-30

## 2023-08-29 RX ORDER — ASPIRIN 325 MG
325 TABLET ORAL DAILY
Status: DISCONTINUED | OUTPATIENT
Start: 2023-08-30 | End: 2023-08-29

## 2023-08-29 RX ORDER — LIDOCAINE HYDROCHLORIDE 20 MG/ML
INJECTION, SOLUTION INFILTRATION; PERINEURAL
Status: DISCONTINUED
Start: 2023-08-29 | End: 2023-08-30 | Stop reason: HOSPADM

## 2023-08-29 RX ORDER — ASPIRIN 300 MG/1
300 SUPPOSITORY RECTAL ONCE
Status: COMPLETED | OUTPATIENT
Start: 2023-08-29 | End: 2023-08-29

## 2023-08-29 RX ORDER — VERAPAMIL HYDROCHLORIDE 2.5 MG/ML
INJECTION, SOLUTION INTRAVENOUS
Status: DISCONTINUED
Start: 2023-08-29 | End: 2023-08-30 | Stop reason: HOSPADM

## 2023-08-29 RX ORDER — BUSPIRONE HYDROCHLORIDE 10 MG/1
15 TABLET ORAL 2 TIMES DAILY
Status: DISCONTINUED | OUTPATIENT
Start: 2023-08-29 | End: 2023-08-29

## 2023-08-29 RX ADMIN — SODIUM CHLORIDE: 9 INJECTION, SOLUTION INTRAVENOUS at 18:25

## 2023-08-29 RX ADMIN — INSULIN HUMAN 3 UNITS: 100 INJECTION, SOLUTION PARENTERAL at 11:12

## 2023-08-29 RX ADMIN — HEPARIN SODIUM: 1000 INJECTION, SOLUTION INTRAVENOUS; SUBCUTANEOUS at 15:54

## 2023-08-29 RX ADMIN — SODIUM BICARBONATE 50 MEQ: 84 INJECTION INTRAVENOUS at 04:38

## 2023-08-29 RX ADMIN — FENTANYL CITRATE 25 MCG: 50 INJECTION, SOLUTION INTRAMUSCULAR; INTRAVENOUS at 17:33

## 2023-08-29 RX ADMIN — CALCIUM GLUCONATE 2 G: 20 INJECTION, SOLUTION INTRAVENOUS at 04:37

## 2023-08-29 RX ADMIN — LIDOCAINE PATCH 5% 1 PATCH: 700 PATCH TOPICAL at 11:07

## 2023-08-29 RX ADMIN — MIDAZOLAM 1 MG: 1 INJECTION, SOLUTION INTRAMUSCULAR; INTRAVENOUS at 17:15

## 2023-08-29 RX ADMIN — PRASUGREL 60 MG: 10 TABLET, FILM COATED ORAL at 17:33

## 2023-08-29 RX ADMIN — NOREPINEPHRINE BITARTRATE 0.1 MCG/KG/MIN: 1 INJECTION, SOLUTION, CONCENTRATE INTRAVENOUS at 07:04

## 2023-08-29 RX ADMIN — VERAPAMIL HYDROCHLORIDE 5 MG: 2.5 INJECTION, SOLUTION INTRAVENOUS at 15:54

## 2023-08-29 RX ADMIN — FENTANYL CITRATE 100 MCG: 50 INJECTION, SOLUTION INTRAMUSCULAR; INTRAVENOUS at 03:17

## 2023-08-29 RX ADMIN — HYDROMORPHONE HYDROCHLORIDE 0.25 MG: 1 INJECTION, SOLUTION INTRAMUSCULAR; INTRAVENOUS; SUBCUTANEOUS at 21:59

## 2023-08-29 RX ADMIN — AMIODARONE HYDROCHLORIDE 0.5 MG/MIN: 1.8 INJECTION, SOLUTION INTRAVENOUS at 01:13

## 2023-08-29 RX ADMIN — ACETAMINOPHEN 650 MG: 650 SUPPOSITORY RECTAL at 07:30

## 2023-08-29 RX ADMIN — OXYCODONE HYDROCHLORIDE 5 MG: 5 TABLET ORAL at 19:07

## 2023-08-29 RX ADMIN — NITROGLYCERIN 10 ML: 20 INJECTION INTRAVENOUS at 15:54

## 2023-08-29 RX ADMIN — ACETAMINOPHEN 650 MG: 325 TABLET, FILM COATED ORAL at 18:25

## 2023-08-29 RX ADMIN — GUAIFENESIN 200 MG: 100 SOLUTION ORAL at 21:59

## 2023-08-29 RX ADMIN — HUMAN ALBUMIN MICROSPHERES AND PERFLUTREN 3 ML: 10; .22 INJECTION, SOLUTION INTRAVENOUS at 09:02

## 2023-08-29 RX ADMIN — FAMOTIDINE 20 MG: 10 INJECTION, SOLUTION INTRAVENOUS at 06:13

## 2023-08-29 RX ADMIN — HEPARIN SODIUM 2000 UNITS: 200 INJECTION, SOLUTION INTRAVENOUS at 15:55

## 2023-08-29 RX ADMIN — IOHEXOL 80 ML: 350 INJECTION, SOLUTION INTRAVENOUS at 17:34

## 2023-08-29 RX ADMIN — ACETAMINOPHEN 650 MG: 650 SUPPOSITORY RECTAL at 12:00

## 2023-08-29 RX ADMIN — INSULIN HUMAN 4 UNITS: 100 INJECTION, SOLUTION PARENTERAL at 08:24

## 2023-08-29 RX ADMIN — AMIODARONE HYDROCHLORIDE 0.5 MG/MIN: 1.8 INJECTION, SOLUTION INTRAVENOUS at 11:05

## 2023-08-29 RX ADMIN — ASPIRIN 300 MG: 300 SUPPOSITORY RECTAL at 11:07

## 2023-08-29 RX ADMIN — DEXTROSE MONOHYDRATE 12.5 G: 25 INJECTION, SOLUTION INTRAVENOUS at 04:58

## 2023-08-29 RX ADMIN — LIDOCAINE HYDROCHLORIDE: 20 INJECTION, SOLUTION INFILTRATION; PERINEURAL at 15:54

## 2023-08-29 ASSESSMENT — PAIN DESCRIPTION - PAIN TYPE
TYPE: ACUTE PAIN

## 2023-08-29 ASSESSMENT — ENCOUNTER SYMPTOMS
MUSCULOSKELETAL NEGATIVE: 1
FEVER: 0
HEADACHES: 0
CONSTITUTIONAL NEGATIVE: 1
EYES NEGATIVE: 1
PSYCHIATRIC NEGATIVE: 1
NERVOUS/ANXIOUS: 0
RESPIRATORY NEGATIVE: 1
WEAKNESS: 0
NEUROLOGICAL NEGATIVE: 1
BRUISES/BLEEDS EASILY: 0
CARDIOVASCULAR NEGATIVE: 1
CHILLS: 0
SHORTNESS OF BREATH: 0
COUGH: 0
GASTROINTESTINAL NEGATIVE: 1
DIZZINESS: 0
NAUSEA: 0
VOMITING: 0
ABDOMINAL PAIN: 0
MYALGIAS: 0
PALPITATIONS: 0

## 2023-08-29 NOTE — ED NOTES
Unable to complete med rec  . Pt is unable to participate in an interview.  Family at bedside unsure of medications.    [place] [facility]   [facility] [phone]

## 2023-08-29 NOTE — HOSPITAL COURSE
8/28 - Admitted to ICU from OSH after multiple VF arrests.  Evaluated by cardiology    8/29 -Echo with LVEF 40%, normal RV.  Triple-vessel disease and cath, 3x CHIOMA    8/30 - Remains stable, start introducing GDMT for HFrEF, transfer to tele

## 2023-08-29 NOTE — DISCHARGE PLANNING
Medical Social Work    Referral: Acute Medical Transfer    Intervention: Pt is a 58 year old male brought in by Roma Mckee from [facility] post Cardiac Arrest.  Pt is Bradly Arecevan (: 1965).  Per medics pt's family is on their way from [facility].  Per transferring facility facesheet pt's emergency contact is his dad, Ramon Daugherty ([phone]).    Plan: SW will follow.

## 2023-08-29 NOTE — RESPIRATORY CARE
At approximately 0350 pt self extubated. He was placed 10L oxymask, no distress noted, sats maintaining. Will continue to monitor

## 2023-08-29 NOTE — THERAPY
"Speech Language Pathology   Clinical Swallow Evaluation     Patient Name: Bradly Daugherty  AGE:  58 y.o., SEX:  male  Medical Record #: [id number]  Date of Service: 8/29/2023      History of Present Illness  58 y.o. male transferred here from outside facility after suffering cardiac arrest requiring multiple rounds of defibrillation/epi. Pt suffered additional cardiac arrest during transfer. Nose fracture s/p fall. S/p TNK and heparinized for high suspicion of pulmonar embolus. Intubated 8/28, self extubated 8/29.    PMHx: type 2 diabetes     CXR (8/29):   \"1.  Pulmonary edema and/or infiltrates are identified, which are stable since the prior exam.  2.  Cardiomegaly\"      General Information:  Vitals  O2 (LPM): 2  O2 Delivery Device: Nasal Cannula  Level of Consciousness: Alert, Awake  Orientation: Self, General place, Situation  Follows Directives: Yes      Prior Living Situation & Level of Function:  Lives with - Patient's Self Care Capacity: Spouse   Swallowing: WFL       Oral Mechanism Evaluation:  Dentition: Fair, Some missing dentition (Missing top front teeth)   Facial Symmetry: Equal   Labial Observations: WFL   Lingual Observations: Midline          Laryngeal Function:  Secretion Management: Adequate  Voice Quality: Hoarse  Cough: Perceptually weak       Subjective  RN cleared patient for clinical swallow evaluation. Pt awake, alert, family at bedside. Pt denied history of dysphagia. Endorsed xerostomia, asking for ice water. Pt currently in wrist restraints, this clinician provided presentation of PO trials.        Assessment  Current Method of Nutrition: NPO until cleared by speech pathology  Positioning: Loza's (60-90 degrees)  Bolus Administration: SLP  O2 (LPM): 2 O2 Delivery Device: Nasal Cannula  Factor(s) Affecting Performance: None  Tracheostomy : No        Swallowing Trials:  Swallowing Trials  Thin Liquid (TN0): WFL  Liquidised (LQ3): WFL  Soft & Bite Sized (SB6): WFL  Regular (RG7): " "WFL      Comments: Pt demo'd adequate oral acceptance, labial assimilation to feeding tools, bolus stripping, and oral containment. Presumed complete AP transfer. No oral bolus residue upon oral inspection. Slightly impaired bite of solids, secondary to missing top front teeth; however, functional mastication appreciated. Weak cough response with initial trials of ice; however, pt reported \"mucus in throat\" causing difficulties. Perceptually weak cough secondary to chest pain resulting from rounds of compression. No cough response across consistencies appreciated following initial clearing of secretions. Wet vocal quality noted prior to oral intake, remained stable throughout PO. No change in sats. Single swallow completed per bolus. No signs of esophageal dysfunction. Provided education regarding general aspiration precautions as well as signs of aspiration, pt stated understanding.       Clinical Impressions  Patient presents with a functional oropharyngeal swallow mechanism for recommended diet of regular solids and thin liquids. 1:1 feeding assistance indicated d/t pt currently in wrist restraints, suspect pt will be independent once restraints removed. SLP to follow to ensure diet tolerance. Please discontinue oral intake if any s/sx of aspiration or change in respiratory status arise.       Recommendations  Diet Consistency: Regular solids/thin liquids  Instrumentation: None indicated at this time  Medication: As tolerated, Whole with liquid  Supervision: 1:1 feeding with constant supervision (Pt currently in wrist restraints, needs assistance with feeding)  Positioning: Fully upright and midline during oral intake  Oral Care: BID       SLP Treatment Plan  Treatment Plan: Dysphagia Treatment  SLP Frequency: 2x Per Week  Estimated Duration: Until Therapy Goals Met      Anticipated Discharge Needs  Discharge Recommendations: Anticipate that the patient will have no further speech therapy needs after discharge " "from the hospital   Therapy Recommendations Upon DC: Not Indicated        Patient / Family Goals  Patient / Family Goal #1: \"Water feels so good\"  Short Term Goals  Short Term Goal # 1: Pt will consume diet of regular solids/thin liquids with no overt s/sx of aspiration or change in pulmonary status      Mayela Bradshaw, SLP   "

## 2023-08-29 NOTE — PROGRESS NOTES
Patient critical potassium result of 6.6, Dr Noonan notified. Dr Noonan also notified about low urine output, < 30 ml/hr.

## 2023-08-29 NOTE — ED NOTES
Bedside report from Kandis BELTRÁN. Pt resting in room, VSS, family at bedside. Pt is on continuous cardiac, pulse ox, capnography and BP  monitors Pt is currently intubated on ventilator, all lines traced. Bed alarm in place, fall sign in place, bed in lowest position.

## 2023-08-29 NOTE — PROGRESS NOTES
Report received from ED RN, patient transferred up to [facility] with 2 RN and RT on ventilator. On levophed and amiodarone, patient on zoll and transport monitor. Family at bedside and followed up to the room, all belongings with family.

## 2023-08-29 NOTE — ED NOTES
Bedside report to Juarez BELTRÁN. Pt transferred out of ED at this time with Juarez RN and Jan RT via gurney. Pt is intubated and on ventilator, with stable vital signs and no apparent distress upon transfer. All paperwork and personal belongings sent with pt to [facility].

## 2023-08-29 NOTE — PROGRESS NOTES
At about 0350 patient self extubated. 10L oxymask, SaO2 99 percent, no stridor, patient work of breathing normal. Dr Noonan to bedside, Dr Gonda notified. To continue to monitor.

## 2023-08-29 NOTE — ASSESSMENT & PLAN NOTE
s/p V-fib arrest  Continue amiodarone  HFrEF w/ LV 40%    Cath with triple vessel disease (See CAD)  Continue heparin drip  Aspirin + Prasugrel    Atorvastatin 80 qhs (liver function still elevated, would start tomorrow or day after)  Start low dose metoprolol, increase and add additional GDMT as able

## 2023-08-29 NOTE — THERAPY
Speech Language Therapy Contact Note    Patient Name: Bradly Daugherty  Age:  58 y.o., Sex:  male  Medical Record #: [id number]  Today's Date: 8/29/2023 08/29/23 0911   Treatment Variance   Reason For Missed Therapy Medical - Other (Please Comment)  (NPO for procedure)   Interdisciplinary Plan of Care Collaboration   IDT Collaboration with  Nursing   Collaboration Comments Orders received for a clinical swallow evaluation. Per RN, pt needs to remain NPO for procedure this AM. SLP to hold and follow to complete CSE when appropriate.

## 2023-08-29 NOTE — PROGRESS NOTES
Patient had several 5 beat run of wide complex tachycardia and then had bigeminal PVCs. Dr Suggs notified, to have RT draw ABG now. To continue to monitor.

## 2023-08-29 NOTE — ED TRIAGE NOTES
Chief Complaint   Patient presents with    Cardiac Arrest     Tx from [facility] for full arresst to [facility]. Pt had witnessed arrest at home- suspecting PE by ems- pt arrived here with heparin, amiodarone, fentanyl, ketamine and levophed infusing

## 2023-08-29 NOTE — ASSESSMENT & PLAN NOTE
Unclear chronicity, but potentially acute    LVEF 40-45% with apical akinesis  Normal RV size and function  Normal valves    Cardiology following  Start metoprolol today, introduce other GDMT as able

## 2023-08-29 NOTE — ED PROVIDER NOTES
ED Provider Note    CHIEF COMPLAINT  Chief Complaint   Patient presents with    Cardiac Arrest     Tx from [facility] for full arresst to [facility]. Pt had witnessed arrest at home- suspecting PE by ems- pt arrived here with heparin, amiodarone, fentanyl, ketamine and levophed infusing         EXTERNAL RECORDS REVIEWED  Other EMS provided signout at the bedside.    HPI/ROS  LIMITATION TO HISTORY   Select: Altered mental status / Confusion  OUTSIDE HISTORIAN(S):  EMS provides all history as well as the transfer records.    Bradly Daugherty is a 58 y.o. male who presents as a transfer from [facility] after he presented there after a witnessed cardiac arrest.  The patient arrested in the field and CPR was started.  The patient received multiple rounds of defibrillation for ventricular fibrillation and they did get return of spontaneous circulation.  It sounds like the patient then went into PEA while transporting to the hospital.  He had another arrest in the hospital they did get return of spontaneous circulation.  The patient was placed on an amiodarone drip.  He had an elevation in the D-dimer and therefore they gave the patient TNK and heparinized him for a possible pulmonary embolus.  According to EMS the patient's only past medical history is significant for diabetes and his blood sugar has been in the 300s.  No other history could be obtained due to the acuity of the situation.    PAST MEDICAL HISTORY       SURGICAL HISTORY  patient denies any surgical history    FAMILY HISTORY  No family history on file.    SOCIAL HISTORY  Social History     Tobacco Use    Smoking status: Not on file    Smokeless tobacco: Not on file   Substance and Sexual Activity    Alcohol use: Not on file    Drug use: Not on file    Sexual activity: Not on file       CURRENT MEDICATIONS  Home Medications    **Home medications have not yet been reviewed for this encounter**         ALLERGIES  No Known Allergies    PHYSICAL EXAM  VITAL  "SIGNS: Pulse 97   Resp (!) 22   Ht 1.854 m (6' 1\")   Wt 103 kg (227 lb)   SpO2 97%   BMI 29.95 kg/m²    In general the patient is obtunded and unresponsive    Head the patient does have abrasions to the forehead as well as the nose    Eyes pupils are 1 and sluggish bilaterally with some mild conjunctival injection, nares and mouth are moist with an oral tracheal and oral gastric tube in place    Pulmonary the patient's lungs were significantly diminished on the left compared to the right consistent with a right mainstem intubation and the endotracheal tube was pulled back and the patient did have symmetric breath sounds.    Cardiovascular S1-S2 with a regular rate and rhythm    Abdomen is soft    Extremities atraumatic with good pulses in the groin bilaterally    Neurologic examination GCS of 3        DIAGNOSTIC STUDIES   Results for orders placed or performed during the hospital encounter of 08/28/23   CBC WITH DIFFERENTIAL   Result Value Ref Range    WBC 28.8 (H) 4.8 - 10.8 K/uL    RBC 5.38 4.70 - 6.10 M/uL    Hemoglobin 16.5 14.0 - 18.0 g/dL    Hematocrit 52.0 42.0 - 52.0 %    MCV 96.7 81.4 - 97.8 fL    MCH 30.7 27.0 - 33.0 pg    MCHC 31.7 (L) 32.3 - 36.5 g/dL    RDW 44.6 35.9 - 50.0 fL    Platelet Count 400 164 - 446 K/uL    MPV 9.6 9.0 - 12.9 fL    Neutrophils-Polys 91.40 (H) 44.00 - 72.00 %    Lymphocytes 4.30 (L) 22.00 - 41.00 %    Monocytes 3.40 0.00 - 13.40 %    Eosinophils 0.00 0.00 - 6.90 %    Basophils 0.90 0.00 - 1.80 %    Nucleated RBC 0.00 0.00 - 0.20 /100 WBC    Neutrophils (Absolute) 26.32 (H) 1.82 - 7.42 K/uL    Lymphs (Absolute) 1.24 1.00 - 4.80 K/uL    Monos (Absolute) 0.98 (H) 0.00 - 0.85 K/uL    Eos (Absolute) 0.00 0.00 - 0.51 K/uL    Baso (Absolute) 0.26 (H) 0.00 - 0.12 K/uL    NRBC (Absolute) 0.00 K/uL   COMP METABOLIC PANEL   Result Value Ref Range    Sodium 133 (L) 135 - 145 mmol/L    Potassium 7.5 (HH) 3.6 - 5.5 mmol/L    Chloride 107 96 - 112 mmol/L    Co2 12 (L) 20 - 33 mmol/L    " Anion Gap 14.0 7.0 - 16.0    Glucose 313 (H) 65 - 99 mg/dL    Bun 25 (H) 8 - 22 mg/dL    Creatinine 1.64 (H) 0.50 - 1.40 mg/dL    Calcium 7.8 (L) 8.5 - 10.5 mg/dL    Correct Calcium 8.3 (L) 8.5 - 10.5 mg/dL    AST(SGOT) 335 (H) 12 - 45 U/L    ALT(SGPT) 278 (H) 2 - 50 U/L    Alkaline Phosphatase 89 30 - 99 U/L    Total Bilirubin 0.2 0.1 - 1.5 mg/dL    Albumin 3.4 3.2 - 4.9 g/dL    Total Protein 6.4 6.0 - 8.2 g/dL    Globulin 3.0 1.9 - 3.5 g/dL    A-G Ratio 1.1 g/dL   PROTHROMBIN TIME   Result Value Ref Range    PT 15.7 (H) 12.0 - 14.6 sec    INR 1.23 (H) 0.87 - 1.13   APTT   Result Value Ref Range    APTT 92.8 (H) 24.7 - 36.0 sec   TROPONIN   Result Value Ref Range    Troponin T 2564 (H) 6 - 19 ng/L   DIFFERENTIAL MANUAL   Result Value Ref Range    Manual Diff Status PERFORMED    PERIPHERAL SMEAR REVIEW   Result Value Ref Range    Peripheral Smear Review see below    PLATELET ESTIMATE   Result Value Ref Range    Plt Estimation Normal    MORPHOLOGY   Result Value Ref Range    RBC Morphology Present     Poikilocytosis 1+     Echinocytes 1+     Toxic Vacuoles Few    ESTIMATED GFR   Result Value Ref Range    GFR (CKD-EPI) 48 (A) >60 mL/min/1.73 m 2   EKG (NOW)   Result Value Ref Range    Report       [facility] Emergency Dept.    Test Date:  2023  Pt Name:    MARYANNE QUINTEROS                Department: ER  MRN:        [id number]                      Room:        06  Gender:     Male                         Technician: [id number]  :        1965                   Requested By:CATA SELBY  Order #:    [id number]                    Reading MD: CATA SELBY MD    Measurements  Intervals                                Axis  Rate:       101                          P:          10  DE:         152                          QRS:        21  QRSD:       146                          T:          135  QT:         371  QTc:        481    Interpretive Statements  Twelve-lead EKG shows a sinus  tachycardia with a ventricular rate of 101, QRS  is widened with poor R wave progression.  Slight ST segment depression  laterally with T wave inversion laterally.  No old EKG to compare  Electronically Signed On 08- 21:28:33 PDT by CATA SELBY MD     POCT arterial blood gas device results   Result Value Ref Range    Ph 7.084 (LL) 7.400 - 7.500    Pco2 58.6 (HH) 26.0 - 37.0 mmHg    Po2 136 (H) 64 - 87 mmHg    Tco2 19 (L) 20 - 33 mmol/L    S02 98 93 - 99 %    Hco3 17.6 17.0 - 25.0 mmol/L    BE -13 (L) -4 - 3 mmol/L    Body Temp see below degrees    O2 Therapy 100 %    iPF Ratio 136     Specimen Arterial     DelSys Vent     Tidal Volume 490 mL    Peep End Expiratory Pressure 8 cmh20    Set Rate 22     Mode APV-CMV    POCT lactate device results   Result Value Ref Range    iStat Lactate 2.8 (H) 0.5 - 2.0 mmol/L       EKG  I have independently interpreted this EKG  Please see my interpretation above      RADIOLOGY  DX-CHEST-PORTABLE (1 VIEW)   Final Result         Intubated.      Diffuse groundglass and airspace opacity throughout both lungs, most in the upper lungs          PROCEDURES Central line placement  Central Line Placement Procedure Note  Indication: poor peripheral access    Consent: Unable to be obtained due to the emergent nature of this procedure.    Procedure: The patient was positioned appropriately and the skin over the right internal jugular vein was prepped with betadine and draped in a sterile fashion. Local anesthesia was obtained by infiltration using 1% Lidocaine without epinephrine.  A large bore needle was used to identify the vein.  A guide wire was then inserted into the vein through the needle. A triple lumen catheter was then inserted into the vessel over the guide wire using the Seldinger technique.  All ports showed good, free flowing blood return and were flushed with saline solution.  The catheter was then securely fastened to the skin with sutures and covered with a sterile  dressing.  A post procedure X-ray was ordered and is still pending at this time.    The patient tolerated the procedure well.    Complications: None    COURSE & MEDICAL DECISION MAKING    This a 58-year-old gentleman who presents as a transfer in critical condition after a witnessed arrest.  According to EMS the patient had a ventricular fibrillation arrest that required multiple times of defibrillation and they did get return of spontaneous circulation.  It sounds like the patient arrested per EMS and then again while at [facility].  They had a high suspicion for possible pulmonary embolus and the patient received TNK as well as heparin.  On arrival the patient is in a narrow complex rhythm.  He was on ketamine as well as fentanyl and he also received rocuronium prior to transfer.  He is sedated at this time but does have good distal pulses.  I placed a central line for further access and in reviewing his work-up his CBC does not show any evidence of anemia.  Does have an elevation white blood cell count of 15,800 most likely due to demargination as I clinically do not appreciate any evidence of an infectious process.  I did review some rhythm strips that were concerning for ventricular tachycardia from the transferring hospital as well as a couple strips that do appear to potentially be ventricular fibrillation.  I have continue the amiodarone.  Initially I was unaware of the CT scan of the head and I turned off the heparin.  CT scan of the head did not show any evidence of intracranial hemorrhage.  Initial troponin was negative but did have a profound lactic acidosis at 10.6.    The patient's ABG does show a mixed respiratory and metabolic acidosis.  I did order 2 A of bicarb for the acidosis.  I notified pulmonary and cardiology is currently on-call.  Laboratory and Alysis does show significant elevation in the troponin.  From a neurologic standpoint the patient is starting to become more purposeful.   His EKG does not show any ischemic change but he does have a significant elevation in the troponin.  He did receive heparin prior to transfer as well as in route.  I turned off the heparin as I was not clear that the patient had a CT scan to make sure there is no evidence of a hemorrhage.  Noting that this was performed and there is no hemorrhage.    The patient's metabolic panel is concerning as it does show significant hyperkalemia and acute renal insufficiency.  The hyperkalemia will be treated as well as the acidosis.  The patient did receive sodium bicarb as mentioned above.  He will receive a liter of fluid, calcium, insulin, and glucose.  I suspect the transaminitis is also from hypotensive shock from his arrest.  Pulmonary has excepted the case and the patient be admitted and continued critical condition.  We will start the heparin again for the elevated troponin and cardiology will consult.    FINAL DIAGNOSIS  1.  Status postcardiac arrest  2.  Suspect secondary to ventricular fibrillation and/or ventricular tachycardia  3.  Central line placement  4.  Hyperkalemia  5.  Acute renal failure  6.  Transaminitis  7.  Critical care time 1 hour and 35 minutes       Electronically signed by: Trenton Esparza M.D., 8/28/2023 7:26 PM

## 2023-08-29 NOTE — CONSULTS
Cardiology Initial Consult Note    Date of note:    8/28/2023      Consulting Physician: Zeke Suggs M.D.    Name:   Bradly Daugherty     YOB: 1965  Age:   58 y.o.  male   MRN:   [id number]    Reason for Consultation: Cardiac arrest    HPI:  Bradly Daugherty is a 58 y.o. male with a history of diabetes on metformin and insulin, who is transferred from [facility] after cardiac arrest with ROSC.    Patient is intubated somewhat agitated so not able to give history.  His significant other Lina is at bedside.  She reports that the patient had called her this afternoon reporting some chest burning sensation.  She reports she went down and gave him some crackers.  After that she left.  He reports the patient was with his boss when he suddenly lost consciousness and hit his head.  He was initially still breathing but then he stopped breathing.  His boss and started CPR and called 911.  Per other reports, patient received multiple rounds of defibrillation for ventricular fibrillation and they did get return of spontaneous circulation.  Then on route perhaps had a PEA and then had another arrest while in the hospital and had ROSC.  Initial ECG dated 1341 shows sinus rhythm, 97 bpm, poor R wave progression, premature ventricular beats, borderline interventricular conduction delay versus atypical left bundle branch block.  There is also a rhythm strip that does show ventricular fibrillation.    The patient was placed on an amiodarone drip at the outside hospital.  As D-dimer is elevated, patient was given TNK and heparinized for possible pulmonary embolism and transferred to [facility] emergency room.    Initial labs here significant for a white count up to 28.8 thousand, potassium 7.5, creatinine 1.63, troponin 2564, INR 1.23, elevated LFTs    Problem list:  Principal Problem:    Cardiac arrest (HCC) (POA: Yes)  Resolved Problems:    * No resolved hospital problems. *      Past Medical History:    Diagnosis Date    Diabetes (HCC)        Past Surgical History:   Procedure Laterality Date    SHOULDER SURGERY Right      (Not in a hospital admission)    Current Facility-Administered Medications   Medication Dose Route Frequency Provider Last Rate Last Admin    amiodarone (Nexterone) 360 mg/200 mL infusion  1 mg/min Intravenous Once Trenton Esparza M.D. 33.3 mL/hr at 08/28/23 1931 1 mg/min at 08/28/23 1931    Followed by    [START ON 8/29/2023] amiodarone (Nexterone) 360 mg/200 mL infusion  0.5 mg/min Intravenous Continuous Trenton Esparza M.D.        norepinephrine (Levophed) 8 mg in 250 mL NS infusion (premix)  0-1 mcg/kg/min (Ideal) Intravenous Continuous Trenton Esparza M.D. 7.5 mL/hr at 08/28/23 1931 0.05 mcg/kg/min at 08/28/23 1931    lactated ringer BOLUS infusion  1,000 mL Intravenous Once Trenton Esparza M.D.        heparin infusion 25,000 units in 500 mL 0.45% NACL  0-30 Units/kg/hr Intravenous Continuous Trenton Esparza M.D.        heparin injection 4,000 Units  4,000 Units Intravenous Once Trenton Esparza M.D.        heparin injection 2,000 Units  2,000 Units Intravenous PRN Trenton Esparza M.D.        Respiratory Therapy Consult   Nebulization Continuous RT Zeke Suggs M.D.        famotidine (Pepcid) tablet 20 mg  20 mg Enteral Tube Q12HRS Zeke Suggs M.D.        Or    famotidine (Pepcid) injection 20 mg  20 mg Intravenous Q12HRS Zeke Suggs M.D.        senna-docusate (Pericolace Or Senokot S) 8.6-50 MG per tablet 2 Tablet  2 Tablet Enteral Tube BID Zeke Suggs M.D.        And    polyethylene glycol/lytes (Miralax) PACKET 1 Packet  1 Packet Enteral Tube QDAY PRN Zeke Suggs M.D.        And    magnesium hydroxide (Milk Of Magnesia) suspension 30 mL  30 mL Enteral Tube QDAY PRN Zeke Suggs M.D.        And    bisacodyl (Dulcolax) suppository 10 mg  10 mg Rectal QDAY PRN Zeke Suggs M.D.        MD Alert...ICU Electrolyte Replacement  "per Pharmacy   Other PHARMACY TO DOSE Zeke Suggs M.D.        lidocaine (Xylocaine) 1 % injection 2 mL  2 mL Tracheal Tube Q30 MIN PRN Zeke Suggs M.D.        ipratropium-albuterol (DUONEB) nebulizer solution  3 mL Nebulization Q2HRS PRN (RT) Zeke Suggs M.D.        dexmedetomidine (PRECEDEX) 400 mcg/100mL NS premix infusion  0-1.5 mcg/kg/hr (Ideal) Intravenous Continuous Zeke Suggs M.D.        fentaNYL (Sublimaze) injection 25 mcg  25 mcg Intravenous Q HOUR PRN Zeke Suggs M.D.        Or    fentaNYL (Sublimaze) injection 50 mcg  50 mcg Intravenous Q HOUR PRN Zeke Suggs M.D.        Or    fentaNYL (Sublimaze) injection 100 mcg  100 mcg Intravenous Q HOUR PRN Zeke Suggs M.D.         No current outpatient medications on file.     No Known Allergies    No family history on file.    Social History     Socioeconomic History    Marital status: Not on file     Spouse name: Not on file    Number of children: Not on file    Years of education: Not on file    Highest education level: Not on file   Occupational History    Not on file   Tobacco Use    Smoking status: Not on file    Smokeless tobacco: Not on file   Substance and Sexual Activity    Alcohol use: Not on file    Drug use: Not on file    Sexual activity: Not on file   Other Topics Concern    Not on file   Social History Narrative    Not on file     Social Determinants of Health     Financial Resource Strain: Not on file   Food Insecurity: Not on file   Transportation Needs: Not on file   Physical Activity: Not on file   Stress: Not on file   Social Connections: Not on file   Intimate Partner Violence: Not on file   Housing Stability: Not on file     No intake or output data in the 24 hours ending 08/28/23 5417        Review of Systems   Reason unable to perform ROS: Intubated.        Physical Exam  Body mass index is 29.95 kg/m².  BP 92/61   Pulse 84   Resp (!) 30   Ht 1.854 m (6' 1\")   Wt 103 kg (227 lb)   SpO2 " 95%   Vitals:    08/28/23 2130 08/28/23 2131 08/28/23 2221 08/28/23 2224   BP:  92/61     Pulse: 81  84 84   Resp: 19  (!) 23 (!) 30   SpO2: 99%  100% 95%   Weight:       Height:         Oxygen Therapy:  Pulse Oximetry: 95 %, O2 Delivery Device: Ventilator    Physical Exam  Constitutional:       Interventions: He is intubated.   Neck:      Vascular: No carotid bruit or JVD.   Cardiovascular:      Rate and Rhythm: Normal rate and regular rhythm.      Pulses:           Radial pulses are 1+ on the right side and 1+ on the left side.      Heart sounds: Normal heart sounds. No murmur heard.     No friction rub. No gallop.      Comments: No obvious murmurs, but difficult exam.  Cool extremities.  Pulmonary:      Effort: Pulmonary effort is normal. No respiratory distress. He is intubated.      Breath sounds: Normal breath sounds. No wheezing or rales.   Abdominal:      General: Bowel sounds are normal.      Palpations: Abdomen is soft.   Musculoskeletal:      Cervical back: Neck supple.      Right lower leg: No edema.      Left lower leg: No edema.   Skin:     General: Skin is warm and dry.   Neurological:      Mental Status: He is alert and oriented to person, place, and time. Mental status is at baseline.   Psychiatric:         Mood and Affect: Mood normal.          Labs (personally reviewed and notable for):   Lab Results   Component Value Date/Time    SODIUM 133 (L) 08/28/2023 08:00 PM    POTASSIUM 7.5 (HH) 08/28/2023 08:00 PM    CHLORIDE 107 08/28/2023 08:00 PM    CO2 12 (L) 08/28/2023 08:00 PM    GLUCOSE 313 (H) 08/28/2023 08:00 PM    BUN 25 (H) 08/28/2023 08:00 PM    CREATININE 1.64 (H) 08/28/2023 08:00 PM      Lab Results   Component Value Date/Time    WBC 28.8 (H) 08/28/2023 08:00 PM    RBC 5.38 08/28/2023 08:00 PM    HEMOGLOBIN 16.5 08/28/2023 08:00 PM    HEMATOCRIT 52.0 08/28/2023 08:00 PM    MCV 96.7 08/28/2023 08:00 PM    MCH 30.7 08/28/2023 08:00 PM    MCHC 31.7 (L) 08/28/2023 08:00 PM    MPV 9.6 08/28/2023  "08:00 PM    NEUTSPOLYS 91.40 (H) 2023 08:00 PM    LYMPHOCYTES 4.30 (L) 2023 08:00 PM    MONOCYTES 3.40 2023 08:00 PM    EOSINOPHILS 0.00 2023 08:00 PM    BASOPHILS 0.90 2023 08:00 PM    INR 1.23 (H) 2023 08:00 PM      No results found for: \"CHOLSTRLTOT\", \"LDL\", \"HDL\", \"TRIGLYCERIDE\"    Lab Results   Component Value Date/Time    TROPONINT 2564 (H) 2023     No results found for: \"NTPROBNP\"  No results found for: \"HBA1C\"  Outside BMP initially 64    Cardiac Imaging and Procedures Review:    EKG dated 2023 at 1949: My personal interpretation is sinus rhythm, 101 bpm, interventricular conduction delay versus atypical left bundle branch block, nonspecific T wave changes, low voltages in the limb leads, no prior ECG for comparison    Radiology test Review:  DX-CHEST-PORTABLE (1 VIEW)   Final Result         Intubated.      Diffuse groundglass and airspace opacity throughout both lungs, most in the upper lungs      DX-CHEST-PORTABLE (1 VIEW)    (Results Pending)     Impression and Medical Decision Makin.  Out of hospital cardiac arrest secondary to VT/VF.  This taken together with report of chest burning prior to VF arrest suggest this likely could be due to MI.  ECG did not show any acute ST elevation.  However patient's ECG does suggest IVCD versus atypical left bundle branch block and no prior comparisons.  Potentially he could have multivessel/left main disease also.  Patient was presumptively given TNK for possible pulmonary embolism which would help with either.  There is no urgent need for cardiac catheterization right now, but down the road patient would benefit from cardiac catheterization to define his coronary anatomy.   - Continue IV amiodarone  - Continue IV heparin for myocardial infarction (elevated troponin)  - Check echocardiogram for LVEF  - Aspirin and statin  - Correct electrolyte imbalances  -Cardiac catheterization once patient stable and " extubated with stable creatinine  - Add beta-blocker if patient's blood pressure remains stable off pressors    2.  Renal insufficiency, creatinine 1.63.  Not sure what his baseline is.  He does have diabetes.    3.  Smoker, 1 ppd.  Will need smoking cessation.     Thank you for allowing me to participate in the care of this patient, cardiology will continue to follow.        Marine Deleon M.D.  Cardiologist, [facility] [place]     This note was generated using voice recognition software which has a small chance of producing errors of grammar and possibly content. I have made every reasonable attempt to find and correct any obvious errors, but expect that some may not be found prior to finalization of this note.

## 2023-08-29 NOTE — PROGRESS NOTES
Cardiology Follow Up Progress Note    Date of Service  8/29/2023    Attending Physician  Scotty Mayo M.D.    Chief Complaint   Out of hospital arrest, VT, acute coronary syndrome, cardiomyopathy.    LUCILLE Daugherty is a 58 y.o. male admitted 8/28/2023 with above.    Interim Events  He self extubated today. He underwent an echocardiogram which revealed reduced left ventricular systolic function.     Review of Systems  Review of Systems   Constitutional: Negative.  Negative for chills and fever.   HENT: Negative.  Negative for hearing loss.    Eyes: Negative.    Respiratory: Negative.  Negative for cough and shortness of breath.    Cardiovascular: Negative.  Negative for chest pain, palpitations and leg swelling.   Gastrointestinal: Negative.  Negative for abdominal pain, nausea and vomiting.   Genitourinary: Negative.  Negative for dysuria and urgency.   Musculoskeletal: Negative.  Negative for myalgias.   Skin: Negative.  Negative for rash.   Neurological: Negative.  Negative for dizziness, weakness and headaches.   Hematological:  Does not bruise/bleed easily.   Psychiatric/Behavioral: Negative.  The patient is not nervous/anxious.        Vital signs in last 24 hours  Temp:  [37.8 °C (100 °F)-38.4 °C (101.1 °F)] 38.4 °C (101.1 °F)  Pulse:  [54-97] 82  Resp:  [1-38] 19  BP: ()/(50-88) 127/85  SpO2:  [95 %-100 %] 96 %    Physical Exam  Physical Exam  Constitutional:       Appearance: Normal appearance. He is well-developed and normal weight.   HENT:      Head: Normocephalic and atraumatic.      Mouth/Throat:      Mouth: Mucous membranes are moist.   Eyes:      Extraocular Movements: Extraocular movements intact.      Conjunctiva/sclera: Conjunctivae normal.   Cardiovascular:      Rate and Rhythm: Normal rate and regular rhythm.      Pulses: Normal pulses.      Heart sounds: Normal heart sounds.   Pulmonary:      Effort: Pulmonary effort is normal.      Breath sounds: Rhonchi present.   Abdominal:       "General: Bowel sounds are normal.      Palpations: Abdomen is soft.   Musculoskeletal:         General: Normal range of motion.      Cervical back: Normal range of motion and neck supple.   Skin:     General: Skin is warm and dry.   Neurological:      General: No focal deficit present.      Mental Status: He is alert and oriented to person, place, and time. Mental status is at baseline.   Psychiatric:         Mood and Affect: Mood normal.         Behavior: Behavior normal.         Thought Content: Thought content normal.         Judgment: Judgment normal.         Lab Review  Lab Results   Component Value Date/Time    WBC 16.4 (H) 08/29/2023 05:59 AM    RBC 4.68 (L) 08/29/2023 05:59 AM    HEMOGLOBIN 14.6 08/29/2023 05:59 AM    HEMATOCRIT 43.2 08/29/2023 05:59 AM    MCV 92.3 08/29/2023 05:59 AM    MCH 31.2 08/29/2023 05:59 AM    MCHC 33.8 08/29/2023 05:59 AM    MPV 9.8 08/29/2023 05:59 AM      Lab Results   Component Value Date/Time    SODIUM 140 08/29/2023 05:59 AM    POTASSIUM 5.1 08/29/2023 05:59 AM    CHLORIDE 108 08/29/2023 05:59 AM    CO2 19 (L) 08/29/2023 05:59 AM    GLUCOSE 281 (H) 08/29/2023 05:59 AM    BUN 33 (H) 08/29/2023 05:59 AM    CREATININE 2.11 (H) 08/29/2023 05:59 AM      Lab Results   Component Value Date/Time    ASTSGOT 232 (H) 08/29/2023 05:59 AM    ALTSGPT 198 (H) 08/29/2023 05:59 AM     Lab Results   Component Value Date/Time    TROPONINT 3505 (H) 08/29/2023 05:59 AM       No results for input(s): \"NTPROBNP\" in the last 72 hours.    (Above labs reviewed.)       Current Facility-Administered Medications:     insulin regular (HumuLIN R,NovoLIN R) injection, 3-14 Units, Subcutaneous, Q6HRS, 4 Units at 08/29/23 0824 **AND** POC blood glucose manual result, , , Q6H **AND** NOTIFY MD and PharmD, , , Once **AND** Administer 20 grams of glucose (approximately 8 ounces of fruit juice) every 15 minutes PRN FSBG less than 70 mg/dL, , , PRN **AND** dextrose 50% (D50W) injection 25 g, 25 g, Intravenous, Q15 " MIN PRN, Scotty Mayo M.D.    acetaminophen (Tylenol) tablet 650 mg, 650 mg, Oral, Q6HRS **OR** acetaminophen (Tylenol) suppository 650 mg, 650 mg, Rectal, Q6HRS, Scotty Mayo M.D., 650 mg at 08/29/23 0730    [COMPLETED] amiodarone (Nexterone) 360 mg/200 mL infusion, 1 mg/min, Intravenous, Once, Stopped at 08/29/23 0113 **FOLLOWED BY** amiodarone (Nexterone) 360 mg/200 mL infusion, 0.5 mg/min, Intravenous, Continuous, Trenton Esparza M.D., Last Rate: 16.7 mL/hr at 08/29/23 0859, 0.5 mg/min at 08/29/23 0859    norepinephrine (Levophed) 8 mg in 250 mL NS infusion (premix), 0-1 mcg/kg/min (Ideal), Intravenous, Continuous, Trenton Esparza M.D., Last Rate: 6 mL/hr at 08/29/23 0859, 0.04 mcg/kg/min at 08/29/23 0859    heparin infusion 25,000 units in 500 mL 0.45% NACL, 0-30 Units/kg/hr, Intravenous, Continuous, Trenton Esparza M.D., Last Rate: 24.7 mL/hr at 08/29/23 0659, 12 Units/kg/hr at 08/29/23 0659    heparin injection 2,000 Units, 2,000 Units, Intravenous, PRN, Trenton Esparza M.D.    Respiratory Therapy Consult, , Nebulization, Continuous RT, Zeke Suggs M.D.    senna-docusate (Pericolace Or Senokot S) 8.6-50 MG per tablet 2 Tablet, 2 Tablet, Enteral Tube, BID **AND** polyethylene glycol/lytes (Miralax) PACKET 1 Packet, 1 Packet, Enteral Tube, QDAY PRN **AND** magnesium hydroxide (Milk Of Magnesia) suspension 30 mL, 30 mL, Enteral Tube, QDAY PRN **AND** bisacodyl (Dulcolax) suppository 10 mg, 10 mg, Rectal, QDAY PRN, Zeke Suggs M.D.    MD Alert...ICU Electrolyte Replacement per Pharmacy, , Other, PHARMACY TO DOSE, Zeke Suggs M.D.    lidocaine (Xylocaine) 1 % injection 2 mL, 2 mL, Tracheal Tube, Q30 MIN PRN, Zeke Suggs M.D.    ipratropium-albuterol (DUONEB) nebulizer solution, 3 mL, Nebulization, Q2HRS PRN (RT), Zeke Suggs M.D.  (Medications reviewed.)    Cardiac Imaging and Procedures Review  CARDIAC STUDIES/PROCEDURES:    ECHOCARDIOGRAM CONCLUSIONS  (08/29/23)  No prior study is available for comparison.   The ascending aorta diameter is  3.8 cm.  Normal LV size and thickness with reduced LV systolic function,   estimated LVEF 40-45% with apical akinesis suggestive of underlying   ischemic cardiomyopathy  No LV apical thrombus  Normal RV size and systolic function  No significant valvular abnormalities  No pericardial effusion  Dilated noncollapsible IVC with estimated right atrial pressure of 15 mmHg.  (study result reviewed)     EKG performed on (08/28/23) was reviewed: EKG personally interpreted shows sinus rhythm with non-specific intra-ventricular conduction delay.     Assessment/Plan  Out of hospital arrest with ventricular tachycardia and acute coronary syndrome: He is awake with elevated troponin levels and abnormal echocardiogram as described above. We will schedule for cardiac catheterization. He understands the risks and benefits and agrees with plan.     Thank you for allowing me to participate in the care of this patient.  I will continue to follow this patient    Please contact me with any questions.    Kishore Fermin M.D.   Cardiologist, [facility]  [phone]

## 2023-08-29 NOTE — CARE PLAN
The patient is Watcher - Medium risk of patient condition declining or worsening    Shift Goals  Clinical Goals: Assess neuro status, hemodynamic stability, no arrhythmias    Progress made toward(s) clinical / shift goals:    Problem: Pain - Standard  Goal: Alleviation of pain or a reduction in pain to the patient’s comfort goal  Description: Target End Date:  Prior to discharge or change in level of care    Document on Vitals flowsheet    1.  Document pain using the appropriate pain scale per order or unit policy  2.  Educate and implement non-pharmacologic comfort measures (i.e. relaxation, distraction, massage, cold/heat therapy, etc.)  3.  Pain management medications as ordered  4.  Reassess pain after pain med administration per policy  5.  If opiods administered assess patient's response to pain medication is appropriate per POSS sedation scale  6.  Follow pain management plan developed in collaboration with patient and interdisciplinary team (including palliative care or pain specialists if applicable)  Outcome: Progressing  Note: Pain assessed and interventions given as indicated     Problem: Safety - Medical Restraint  Goal: Remains free of injury from restraints (Restraint for Interference with Medical Device)  Description: INTERVENTIONS:  1. Determine that other, less restrictive measures have been tried or would not be effective before applying the restraint  2. Evaluate the patient's condition at the time of restraint application  3. Educate patient/family regarding the reason for restraint  4. Q2H: Monitor safety, psychosocial status, comfort, circulation, respiratory status, LOC, nutrition and hydration  Outcome: Progressing  Flowsheets (Taken 8/29/2023 7904)  Addressed this shift: Remains free of injury from restraints (restraint for interference with medical device):   Determine that other, less restrictive measures have been tried or would not be effective before applying the restraint   Evaluate  the patient's condition at the time of restraint application   Inform patient/family regarding the reason for restraint   Every 2 hours: Monitor safety, psychosocial status, comfort, nutrition and hydration  Note: Patient free from injury related to restraints.        Problem: Hemodynamics  Goal: Patient's hemodynamics, fluid balance and neurologic status will be stable or improve  Description: Target End Date:  Prior to discharge or change in level of care    Document on Assessment and I/O flowsheet templates    1.  Monitor vital signs, pulse oximetry and cardiac monitor per provider order and/or policy  2.  Maintain blood pressure per provider order  3.  Hemodynamic monitoring per provider order  4.  Manage IV fluids and IV infusions  5.  Monitor intake and output  6.  Daily weights per unit policy or provider order  7.  Assess peripheral pulses and capillary refill  8.  Assess color and body temperature  9.  Position patient for maximum circulation/cardiac output  10. Monitor for signs/symptoms of excessive bleeding  11. Assess mental status, restlessness and changes in level of consciousness  12. Monitor temperature and report fever or hypothermia to provider immediately. Consideration of targeted temperature management.  Outcome: Progressing  Note: Titrating levophed       Problem: Respiratory  Goal: Patient will achieve/maintain optimum respiratory ventilation and gas exchange  Description: Target End Date:  Prior to discharge or change in level of care    Document on Assessment flowsheet    1.  Assess and monitor rate, rhythm, depth and effort of respiration  2.  Breath sounds assessed qshift and/or as needed  3.  Assess O2 saturation, administer/titrate oxygen as ordered  4.  Position patient for maximum ventilatory efficiency  5.  Turn, cough, and deep breath with splinting to improve effectiveness  6.  Collaborate with RT to administer medication/treatments per order  7.  Encourage use of incentive  spirometer and encourage patient to cough after use and utilize splinting techniques if applicable  8.  Airway suctioning  9.  Monitor sputum production for changes in color, consistency and frequency  10. Perform frequent oral hygiene  11. Alternate physical activity with rest periods  Outcome: Progressing  Note: Maintaining SaO2 on oxymask after self extubation         Patient is not progressing towards the following goals:

## 2023-08-29 NOTE — H&P
"Critical Care/Pulmonary H&P    Date of Service: 8/28/2023    Date of Admission:  8/28/2023  7:01 PM    Consulting Physician: Zeke Suggs M.D.    Chief Complaint:  Cardiac Arrest (Tx from [facility] for full arresst to ROSC. Pt had witnessed arrest at home- suspecting PE by ems- pt arrived here with heparin, amiodarone, fentanyl, ketamine and levophed infusing  )      History of Present Illness: Bradly Daugherty is a 58 y.o. male with a past medical history of diabetes transferred from [facility] after suffering cardiac arrest.  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  On transition to outside facility underwent cardiac arrest again where ROSC was again achieved.  He was started on amiodarone and epi drip and subsequent work-up showed elevated D-dimer.  Was told CT was inconclusive and patient was given TNK and heparinized for high suspicion of pulmonary embolus.  Patient also reportedly fell face forward and on outside imaging shows nose fracture. On arrival patient was not acidotic and given 2 A of bicarb.  In ED labs also revealed hyperkalemia and transaminitis/lul consistent with shock and elevated troponins.    Review of Systems   Unable to perform ROS: Critical illness       Home Medications       Reviewed by Yajaira Morris (Pharmacy Tech) on 08/28/23 at 2213  Med List Status: Unable to Obtain     Medication Last Dose Status        Patient Haroon Taking any Medications                                 Past Medical History:   Diagnosis Date    Diabetes (HCC)        Past Surgical History:   Procedure Laterality Date    SHOULDER SURGERY Right        Allergies: Patient has no known allergies.    No family history on file.    Vitals:    08/28/23 1900 08/28/23 1904 08/28/23 1905 08/28/23 1906   Height: 1.88 m (6' 2\")      Weight: 103 kg (227 lb 1.2 oz)      Weight % change since last entry.: 0 %    " "  BP:       Pulse:  97 97 97   BMI (Calculated): 29.15      Resp:  (!) 38 (!) 22 (!) 23    08/28/23 1908 08/28/23 1910 08/28/23 1911 08/28/23 2120   Height: 1.854 m (6' 1\")      Weight: 103 kg (227 lb)      Weight % change since last entry.: -0.03 %      BP:   94/58 (!) 88/54   Pulse: 96 97 97 82   BMI (Calculated): 29.95      Resp: (!) 23 (!) 22 (!) 22 (!) 29    08/28/23 2130 08/28/23 2131 08/28/23 2221 08/28/23 2224   Height:       Weight:       Weight % change since last entry.:       BP:  92/61     Pulse: 81  84 84   BMI (Calculated):       Resp: 19  (!) 23 (!) 30    08/28/23 2300   Height:    Weight: 101 kg (222 lb 3.6 oz)   Weight % change since last entry.: -2.1 %   BP:    Pulse:    BMI (Calculated):    Resp:        Physical Exam  Constitutional:       Appearance: He is ill-appearing and toxic-appearing.   HENT:      Head: Normocephalic.      Right Ear: Tympanic membrane, ear canal and external ear normal.      Left Ear: Tympanic membrane, ear canal and external ear normal.   Eyes:      Extraocular Movements: Extraocular movements intact.   Cardiovascular:      Rate and Rhythm: Normal rate and regular rhythm.      Pulses: Normal pulses.      Heart sounds: Normal heart sounds.   Pulmonary:      Effort: Pulmonary effort is normal.   Chest:      Chest wall: Tenderness present.   Abdominal:      General: Abdomen is flat. Bowel sounds are normal.   Musculoskeletal:         General: Normal range of motion.           No intake or output data in the 24 hours ending 08/28/23 2227    Recent Labs     08/28/23 2000   WBC 28.8*   NEUTSPOLYS 91.40*   LYMPHOCYTES 4.30*   MONOCYTES 3.40   EOSINOPHILS 0.00   BASOPHILS 0.90   ASTSGOT 335*   ALTSGPT 278*   ALKPHOSPHAT 89   TBILIRUBIN 0.2     Recent Labs     08/28/23 2000   SODIUM 133*   POTASSIUM 7.5*   CHLORIDE 107   CO2 12*   BUN 25*   CREATININE 1.64*   CALCIUM 7.8*     Recent Labs     08/28/23 2000   ALTSGPT 278*   ASTSGOT 335*   ALKPHOSPHAT 89   TBILIRUBIN 0.2   GLUCOSE " 313*     Recent Labs     08/28/23 1956   ISTATAPH 7.084*   ISTATAPCO2 58.6*   ISTATAPO2 136*   ISTATATCO2 19*   XGAQIHI1VBT 98   ISTATARTHCO3 17.6   ISTATARTBE -13*   ISTATTEMP see below   ISTATFIO2 100   ISTATSPEC Arterial     DX-CHEST-PORTABLE (1 VIEW)   Final Result         Intubated.      Diffuse groundglass and airspace opacity throughout both lungs, most in the upper lungs      DX-CHEST-PORTABLE (1 VIEW)    (Results Pending)   EC-ECHOCARDIOGRAM COMPLETE W/O CONT    (Results Pending)   US-EXTREMITY VENOUS LOWER BILAT    (Results Pending)   DX-ABDOMEN FOR TUBE PLACEMENT    (Results Pending)       Patient Active Problem List   Diagnosis    Cardiac arrest (HCC)    Respiratory failure requiring intubation (HCC)    Hyperkalemia    MARCIE (acute kidney injury) (HCC)    Transaminitis    Diabetes (HCC)    Acidosis       Assessment and Plan:    Cardiac arrest (HCC)  -s/p vfib arrest/PEA  -cardiology following  -cont amiodarone gtt  -signs of shock. Continue dobutamine gtt, norepi gtt  -trend trops  -echo  -cardiac monitor  -optimize lytes  -patient purposeful and able to follow commands    Respiratory failure requiring intubation (HCC)  -intubated  -RT protocol  -s/p TNK in outside facility from concerns of PE from elevated D dimer  -cont heparin  -npo  -dvt u/s    Hyperkalemia  K+ on admission 7.5  S/p calcium gluconate & insulin  Scheduled cmp check    MARCIE (acute kidney injury) (HCC)  In setting of shock  Contrast used in outside facility  Avoid nephrotoxic agents  S/p fluid bolus in ED  CTM    Transaminitis  In setting of shock  Avoid hepatotoxic agents  Trend coags, ammonia, lfts  ctm    Diabetes (HCC)  ha1c   ssi low correctional  Hypoglycemia protocol    Acidosis  S/p 2 A bicarb  S/p fluid bolus  Lactic acid 2.8  nongap acidosis  abg daily trend        Vera Noonan M.D., MD  [facility]

## 2023-08-29 NOTE — ASSESSMENT & PLAN NOTE
Hemoglobin A1c 9.0  Given hyperglycemia I have ordered Lantus  Continue sliding scale insulin  Monitor CBGs as p.o. intake improves

## 2023-08-29 NOTE — ASSESSMENT & PLAN NOTE
Due to CAD  Cardiology following  Continue heparin drip  Telemetry  Amiodarone infusion    Maintain normoxia and avoid hyperoxia in the early arrest period and maintain eucapnea.   MAP>65-70  Normothermia  Maintain euglycemia.

## 2023-08-29 NOTE — PROGRESS NOTES
4 Eyes Skin Assessment Completed by LEIGH ANN Pizarro and LEIGH ANN Esparza.    Head forehead abraision  Ears WDL  Nose Abraision nose  Mouth WDL  Neck WDL  Breast/Chest WDL  Shoulder Blades WDL  Spine WDL  (R) Arm/Elbow/Hand WDL  (L) Arm/Elbow/Hand WDL  Abdomen WDL  Groin WDL  Scrotum/Coccyx/Buttocks Redness and Blanching  (R) Leg Scars  (L) Leg Scars  (R) Heel/Foot/Toe WDL, callous  (L) Heel/Foot/Toe WDL, callous          Devices In Places ECG, Blood Pressure Cuff, Pulse Ox, Haas, SCD's, ET Tube, OG/NG, and Central Line      Interventions In Place Sacral Mepilex, Pillows, Q2 Turns, Low Air Loss Mattress, Barrier Cream, and Heels Loaded W/Pillows    Possible Skin Injury No    Pictures Uploaded Into Epic Yes  Wound Consult Placed N/A  RN Wound Prevention Protocol Ordered No

## 2023-08-29 NOTE — ASSESSMENT & PLAN NOTE
-s/p vfib arrest/PEA  -Secondary to ischemic cardiomyopathy    Echocardiogram reviewed EF 40-45% with apical akinesis     status post cardiac catheterization with Multivessel PCI with PCI to LAD circumflex and RCA  Continue medical therapy with aspirin Effient atorvastatin losartan metoprolol spironolactone and farxiga  Reinforced importance of smoking cessation and complying with medical therapy and lifestyle changes  Cardiology following  Continue telemetry monitoring

## 2023-08-29 NOTE — PROGRESS NOTES
Critical Care Progress Note    Date of admission  8/28/2023    Chief Complaint  58 y.o. male with a history of type 2 diabetes who was transferred from [facility] after suffering out of hospital cardiac arrest.  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  He suffered another cardiac arrest on arrival to the outside hospital where he was again defibrillated and ROSC was achieved.  Started on amiodarone and epinephrine, he is an elevated D-dimer and an inconclusive CTA of his chest and so he was given TNK and heparin for possible pulmonary embolism.  Also suffered a nasal fracture from his fall.  No acute STEMI on ECG but he was evaluated by cardiology who recommends coronary angiography when more stable.    Hospital Course  8/28 - Admitted to ICU from OSH after multiple VF arrests.  Evaluated by cardiology    Interval Problem Update  Reviewed last 24 hour events:  Tmax: 101.5 °F  Diet: N.p.o.  Vasopressors:   Norepinephrine 16 mcg/min    Infusions:   Amiodarone  Precedex  Heparin    Antibx: None    Intake / Output  Urine Output past 24 hours: 250 mL    Lab Trends  WBC 29 --> 16    K 6.6 --> 5  CO2 16 --> 19  Glucose 281  Cr 2.3 --> 2.1     --> 232   --> 198    Troponin 2700 --> 3500    Review of Systems  Review of Systems   Unable to perform ROS: Intubated        Vital Signs for last 24 hours   Temp:  [37.8 °C (100 °F)-38.4 °C (101.1 °F)] 38.4 °C (101.1 °F)  Pulse:  [54-97] 82  Resp:  [1-38] 19  BP: ()/(50-88) 127/85  SpO2:  [95 %-100 %] 96 %    Hemodynamic parameters for last 24 hours       Respiratory Information for the last 24 hours  Vent Mode: APVCMV  Rate (breaths/min): 28  Vt Target (mL): 570  PEEP/CPAP: 8  MAP: 14  Control VTE (exp VT): 571    Physical Exam   Physical Exam  Vitals and nursing note reviewed. Exam conducted with a chaperone present.   Constitutional:       General: He is not in acute distress.      Appearance: Normal appearance. He is not ill-appearing.   HENT:      Head: Normocephalic.      Mouth/Throat:      Mouth: Mucous membranes are moist.   Eyes:      Extraocular Movements: Extraocular movements intact.   Cardiovascular:      Rate and Rhythm: Normal rate and regular rhythm.      Pulses: Normal pulses.   Pulmonary:      Effort: Pulmonary effort is normal. No respiratory distress.   Abdominal:      General: There is no distension.      Palpations: Abdomen is soft.      Tenderness: There is no abdominal tenderness. There is no guarding or rebound.   Musculoskeletal:         General: Normal range of motion.      Cervical back: Normal range of motion and neck supple.   Skin:     General: Skin is warm and dry.      Capillary Refill: Capillary refill takes less than 2 seconds.   Neurological:      General: No focal deficit present.      Mental Status: He is alert and oriented to person, place, and time. Mental status is at baseline.         Medications  Current Facility-Administered Medications   Medication Dose Route Frequency Provider Last Rate Last Admin    insulin regular (HumuLIN R,NovoLIN R) injection  3-14 Units Subcutaneous Q6HRS Scotty Mayo M.D.   4 Units at 08/29/23 0824    And    dextrose 50% (D50W) injection 25 g  25 g Intravenous Q15 MIN PRN Scotty Mayo M.D.        acetaminophen (Tylenol) tablet 650 mg  650 mg Oral Q6HRS Scotty Mayo M.D.        Or    acetaminophen (Tylenol) suppository 650 mg  650 mg Rectal Q6HRS Scotty Mayo M.D.   650 mg at 08/29/23 0730    [START ON 8/30/2023] aspirin (Asa) tablet 325 mg  325 mg Oral DAILY Kishore Fermin M.D.        lidocaine (Lidoderm) 5 % 1 Patch  1 Patch Transdermal Q24HR Scotty Mayo M.D.        aspirin (Asa) suppository 300 mg  300 mg Rectal Once Scotty Mayo M.D.        amiodarone (Nexterone) 360 mg/200 mL infusion  0.5 mg/min Intravenous Continuous Trenton Esparza M.D. 16.7 mL/hr at 08/29/23 0859 0.5 mg/min at 08/29/23 0859     norepinephrine (Levophed) 8 mg in 250 mL NS infusion (premix)  0-1 mcg/kg/min (Ideal) Intravenous Continuous Trenton Esparza M.D. 6 mL/hr at 08/29/23 0859 0.04 mcg/kg/min at 08/29/23 0859    heparin infusion 25,000 units in 500 mL 0.45% NACL  0-30 Units/kg/hr Intravenous Continuous Trenton Esparza M.D. 24.7 mL/hr at 08/29/23 0659 12 Units/kg/hr at 08/29/23 0659    heparin injection 2,000 Units  2,000 Units Intravenous PRN Trenton Esparza M.D.        Respiratory Therapy Consult   Nebulization Continuous RT Zeke Suggs M.D.        senna-docusate (Pericolace Or Senokot S) 8.6-50 MG per tablet 2 Tablet  2 Tablet Enteral Tube BID Zeke Suggs M.D.        And    polyethylene glycol/lytes (Miralax) PACKET 1 Packet  1 Packet Enteral Tube QDAY PRN Zeke Suggs M.D.        And    magnesium hydroxide (Milk Of Magnesia) suspension 30 mL  30 mL Enteral Tube QDAY PRN Zeke Suggs M.D.        And    bisacodyl (Dulcolax) suppository 10 mg  10 mg Rectal QDAY PRN Zeke Suggs M.D.        MD Alert...ICU Electrolyte Replacement per Pharmacy   Other PHARMACY TO DOSE Zeke Suggs M.D.        lidocaine (Xylocaine) 1 % injection 2 mL  2 mL Tracheal Tube Q30 MIN PRN Zeke Suggs M.D.        ipratropium-albuterol (DUONEB) nebulizer solution  3 mL Nebulization Q2HRS PRN (RT) Zeke Suggs M.D.           Fluids    Intake/Output Summary (Last 24 hours) at 8/29/2023 1005  Last data filed at 8/29/2023 0859  Gross per 24 hour   Intake 1824.4 ml   Output 560 ml   Net 1264.4 ml       Laboratory  Recent Labs     08/28/23  1956 08/29/23  0011   ISTATAPH 7.084* 7.246*   ISTATAPCO2 58.6* 39.7*   ISTATAPO2 136* 267*   ISTATATCO2 19* 18*   PKCBATP6LNL 98 100*   ISTATARTHCO3 17.6 17.3   ISTATARTBE -13* -10*   ISTATTEMP see below 37.8 C   ISTATFIO2 100 80   ISTATSPEC Arterial Arterial   ISTATAPHTC  --  7.235*   [id number]  --  271*         Recent Labs     08/29/23  0056 08/29/23  0239 08/29/23  0559    SODIUM 137 137 140   POTASSIUM 5.3 6.6* 5.1   CHLORIDE 106 108 108   CO2 16* 16* 19*   BUN 28* 32* 33*   CREATININE 1.84* 2.29* 2.11*   MAGNESIUM  --   --  1.5   PHOSPHORUS  --   --  3.7   CALCIUM 8.5 8.8 8.9     Recent Labs     08/28/23 2000 08/29/23  0056 08/29/23  0239 08/29/23  0559   ALTSGPT 278*  --   --  198*   ASTSGOT 335*  --   --  232*   ALKPHOSPHAT 89  --   --  69   TBILIRUBIN 0.2  --   --  0.2   GLUCOSE 313* 259* 292* 281*     Recent Labs     08/28/23 2000 08/29/23  0559   WBC 28.8* 16.4*   NEUTSPOLYS 91.40* 84.40*   LYMPHOCYTES 4.30* 8.20*   MONOCYTES 3.40 6.40   EOSINOPHILS 0.00 0.00   BASOPHILS 0.90 0.10   ASTSGOT 335* 232*   ALTSGPT 278* 198*   ALKPHOSPHAT 89 69   TBILIRUBIN 0.2 0.2     Recent Labs     08/28/23 2000 08/28/23  2330 08/29/23  0559   RBC 5.38  --  4.68*   HEMOGLOBIN 16.5  --  14.6   HEMATOCRIT 52.0  --  43.2   PLATELETCT 400  --  315   PROTHROMBTM 15.7* 15.9*  --    APTT 92.8* 29.9  --    INR 1.23* 1.26*  --        Imaging  X-Ray:  I have personally reviewed the images and compared with prior images. and My impression is: Lines and tubes in appropriate position, mild edema    Assessment/Plan  * Cardiac arrest with ventricular fibrillation (HCC)- (present on admission)  Assessment & Plan  Likely underlying coronary disease - pending cardiac catheterization  Troponin markedly elevated  With supportive care organ perfusion is improving  We will need cardiac catheterization  Cardiology following  Continue heparin drip  Telemetry  Amiodarone infusion    Maintain normoxia and avoid hyperoxia in the early arrest period and maintain eucapnea.   MAP>65-70  Normothermia  Maintain euglycemia.   Monitor neuro status and get EEG (5-20% w/ seizure)  Treat and prevent shivering  Consider MRI day 3-5 if no improvement in mental status    ACS (acute coronary syndrome) (HCC)  Assessment & Plan  Now with V-fib arrest  Continue amiodarone  HFrEF present, likely acute but not certain  Pending cardiac  catheterization today  Continue heparin drip  Aspirin    Will need high-dose statin when liver function improves  We will need guideline directed medical therapy for heart failure once stabilized    Respiratory failure requiring intubation ([facility])  Assessment & Plan  Self extubated 8/29  Monitor need for reintubation    HFrEF (heart failure with reduced ejection fraction) ([facility])  Assessment & Plan  Unclear chronicity, but potentially acute    LVEF 40-45% with apical akinesis  Normal RV size and function  Normal valves    Pending cardiac catheterization today  Cardiology following  Post cath and post stabilization will begin initiating guideline directed medical therapy    High anion gap metabolic acidosis  Assessment & Plan  Due to shock  Improving  Trend    Type 2 diabetes mellitus with hyperglycemia, without long-term current use of insulin ([facility])  Assessment & Plan  High SSI  A1c 9  Determine needs for glargine    Transaminitis  Assessment & Plan  Improving  Due to shock from cardiac arrest    MARCIE (acute kidney injury) ([facility])  Assessment & Plan  Strict I/Os  Renally dosed medications  Avoid nephrotoxic agents as able  Trend    Hyperkalemia  Assessment & Plan  Resolved  Trend         VTE:   Heparin infusion  Ulcer: H2 Antagonist  Lines: Central Line  Ongoing indication addressed and Haas Catheter  Ongoing indication addressed    I have performed a physical exam and reviewed and updated ROS and Plan today (8/29/2023). In review of yesterday's note (8/28/2023), there are no changes except as documented above.     Discussed patient condition and risk of morbidity and/or mortality with Family, RN, RT, Pharmacy, , [employer] [facility] resident, Charge nurse / hot rounds, and cardiology    The patient remains critically ill.  Critical care time = 52 minutes in directly providing and coordinating critical care and extensive data review.  No time overlap and excludes procedures.

## 2023-08-30 PROBLEM — R07.89 CHEST WALL PAIN: Status: ACTIVE | Noted: 2023-08-30

## 2023-08-30 PROBLEM — E87.29 HIGH ANION GAP METABOLIC ACIDOSIS: Status: RESOLVED | Noted: 2023-08-28 | Resolved: 2023-08-30

## 2023-08-30 PROBLEM — E66.9 OBESITY: Status: ACTIVE | Noted: 2023-08-30

## 2023-08-30 PROBLEM — E87.5 HYPERKALEMIA: Status: RESOLVED | Noted: 2023-08-28 | Resolved: 2023-08-30

## 2023-08-30 PROBLEM — Z79.4 TYPE 2 DIABETES MELLITUS WITH HYPERGLYCEMIA, WITH LONG-TERM CURRENT USE OF INSULIN (HCC): Status: ACTIVE | Noted: 2023-08-28

## 2023-08-30 PROBLEM — S02.2XXA NOSE FRACTURE: Status: ACTIVE | Noted: 2023-08-30

## 2023-08-30 PROBLEM — I25.110 CORONARY ARTERY DISEASE INVOLVING NATIVE CORONARY ARTERY OF NATIVE HEART WITH UNSTABLE ANGINA PECTORIS (HCC): Status: ACTIVE | Noted: 2023-08-30

## 2023-08-30 PROBLEM — F17.200 TOBACCO DEPENDENCE: Status: ACTIVE | Noted: 2023-08-30

## 2023-08-30 LAB
ALBUMIN SERPL BCP-MCNC: 3 G/DL (ref 3.2–4.9)
ALBUMIN/GLOB SERPL: 1 G/DL
ALP SERPL-CCNC: 64 U/L (ref 30–99)
ALT SERPL-CCNC: 139 U/L (ref 2–50)
ANION GAP SERPL CALC-SCNC: 10 MMOL/L (ref 7–16)
ANION GAP SERPL CALC-SCNC: 11 MMOL/L (ref 7–16)
AST SERPL-CCNC: 165 U/L (ref 12–45)
BILIRUB SERPL-MCNC: 0.3 MG/DL (ref 0.1–1.5)
BUN SERPL-MCNC: 22 MG/DL (ref 8–22)
BUN SERPL-MCNC: 23 MG/DL (ref 8–22)
CALCIUM ALBUM COR SERPL-MCNC: 9.5 MG/DL (ref 8.5–10.5)
CALCIUM SERPL-MCNC: 8.7 MG/DL (ref 8.5–10.5)
CALCIUM SERPL-MCNC: 8.8 MG/DL (ref 8.5–10.5)
CHLORIDE SERPL-SCNC: 104 MMOL/L (ref 96–112)
CHLORIDE SERPL-SCNC: 105 MMOL/L (ref 96–112)
CO2 SERPL-SCNC: 21 MMOL/L (ref 20–33)
CO2 SERPL-SCNC: 22 MMOL/L (ref 20–33)
CREAT SERPL-MCNC: 1.11 MG/DL (ref 0.5–1.4)
CREAT SERPL-MCNC: 1.2 MG/DL (ref 0.5–1.4)
EKG IMPRESSION: NORMAL
ERYTHROCYTE [DISTWIDTH] IN BLOOD BY AUTOMATED COUNT: 44.6 FL (ref 35.9–50)
GFR SERPLBLD CREATININE-BSD FMLA CKD-EPI: 70 ML/MIN/1.73 M 2
GFR SERPLBLD CREATININE-BSD FMLA CKD-EPI: 77 ML/MIN/1.73 M 2
GLOBULIN SER CALC-MCNC: 2.9 G/DL (ref 1.9–3.5)
GLUCOSE BLD STRIP.AUTO-MCNC: 168 MG/DL (ref 65–99)
GLUCOSE BLD STRIP.AUTO-MCNC: 169 MG/DL (ref 65–99)
GLUCOSE BLD STRIP.AUTO-MCNC: 175 MG/DL (ref 65–99)
GLUCOSE BLD STRIP.AUTO-MCNC: 199 MG/DL (ref 65–99)
GLUCOSE BLD STRIP.AUTO-MCNC: 207 MG/DL (ref 65–99)
GLUCOSE SERPL-MCNC: 170 MG/DL (ref 65–99)
GLUCOSE SERPL-MCNC: 173 MG/DL (ref 65–99)
HCT VFR BLD AUTO: 39.2 % (ref 42–52)
HGB BLD-MCNC: 12.9 G/DL (ref 14–18)
MAGNESIUM SERPL-MCNC: 1.4 MG/DL (ref 1.5–2.5)
MCH RBC QN AUTO: 31.1 PG (ref 27–33)
MCHC RBC AUTO-ENTMCNC: 32.9 G/DL (ref 32.3–36.5)
MCV RBC AUTO: 94.5 FL (ref 81.4–97.8)
PHOSPHATE SERPL-MCNC: 2.4 MG/DL (ref 2.5–4.5)
PLATELET # BLD AUTO: 209 K/UL (ref 164–446)
PMV BLD AUTO: 9.8 FL (ref 9–12.9)
POTASSIUM SERPL-SCNC: 4.1 MMOL/L (ref 3.6–5.5)
POTASSIUM SERPL-SCNC: 4.2 MMOL/L (ref 3.6–5.5)
PROT SERPL-MCNC: 5.9 G/DL (ref 6–8.2)
RBC # BLD AUTO: 4.15 M/UL (ref 4.7–6.1)
SODIUM SERPL-SCNC: 136 MMOL/L (ref 135–145)
SODIUM SERPL-SCNC: 137 MMOL/L (ref 135–145)
TROPONIN T SERPL-MCNC: 1785 NG/L (ref 6–19)
TROPONIN T SERPL-MCNC: 1907 NG/L (ref 6–19)
WBC # BLD AUTO: 9 K/UL (ref 4.8–10.8)

## 2023-08-30 PROCEDURE — 82962 GLUCOSE BLOOD TEST: CPT | Mod: 91

## 2023-08-30 PROCEDURE — 700101 HCHG RX REV CODE 250: Performed by: STUDENT IN AN ORGANIZED HEALTH CARE EDUCATION/TRAINING PROGRAM

## 2023-08-30 PROCEDURE — 700111 HCHG RX REV CODE 636 W/ 250 OVERRIDE (IP): Mod: JZ | Performed by: EMERGENCY MEDICINE

## 2023-08-30 PROCEDURE — 99223 1ST HOSP IP/OBS HIGH 75: CPT | Mod: 25 | Performed by: HOSPITALIST

## 2023-08-30 PROCEDURE — 84484 ASSAY OF TROPONIN QUANT: CPT

## 2023-08-30 PROCEDURE — 700105 HCHG RX REV CODE 258

## 2023-08-30 PROCEDURE — 80053 COMPREHEN METABOLIC PANEL: CPT

## 2023-08-30 PROCEDURE — 99232 SBSQ HOSP IP/OBS MODERATE 35: CPT | Performed by: INTERNAL MEDICINE

## 2023-08-30 PROCEDURE — 99233 SBSQ HOSP IP/OBS HIGH 50: CPT | Performed by: STUDENT IN AN ORGANIZED HEALTH CARE EDUCATION/TRAINING PROGRAM

## 2023-08-30 PROCEDURE — 85027 COMPLETE CBC AUTOMATED: CPT

## 2023-08-30 PROCEDURE — 700102 HCHG RX REV CODE 250 W/ 637 OVERRIDE(OP): Performed by: HOSPITALIST

## 2023-08-30 PROCEDURE — 94640 AIRWAY INHALATION TREATMENT: CPT

## 2023-08-30 PROCEDURE — A9270 NON-COVERED ITEM OR SERVICE: HCPCS

## 2023-08-30 PROCEDURE — 93010 ELECTROCARDIOGRAM REPORT: CPT | Performed by: INTERNAL MEDICINE

## 2023-08-30 PROCEDURE — 700101 HCHG RX REV CODE 250: Performed by: INTERNAL MEDICINE

## 2023-08-30 PROCEDURE — 700102 HCHG RX REV CODE 250 W/ 637 OVERRIDE(OP): Performed by: INTERNAL MEDICINE

## 2023-08-30 PROCEDURE — 770020 HCHG ROOM/CARE - TELE (206)

## 2023-08-30 PROCEDURE — 700102 HCHG RX REV CODE 250 W/ 637 OVERRIDE(OP)

## 2023-08-30 PROCEDURE — 83735 ASSAY OF MAGNESIUM: CPT

## 2023-08-30 PROCEDURE — A9270 NON-COVERED ITEM OR SERVICE: HCPCS | Performed by: STUDENT IN AN ORGANIZED HEALTH CARE EDUCATION/TRAINING PROGRAM

## 2023-08-30 PROCEDURE — 80048 BASIC METABOLIC PNL TOTAL CA: CPT

## 2023-08-30 PROCEDURE — 700111 HCHG RX REV CODE 636 W/ 250 OVERRIDE (IP)

## 2023-08-30 PROCEDURE — A9270 NON-COVERED ITEM OR SERVICE: HCPCS | Performed by: HOSPITALIST

## 2023-08-30 PROCEDURE — A9270 NON-COVERED ITEM OR SERVICE: HCPCS | Performed by: INTERNAL MEDICINE

## 2023-08-30 PROCEDURE — 700102 HCHG RX REV CODE 250 W/ 637 OVERRIDE(OP): Performed by: STUDENT IN AN ORGANIZED HEALTH CARE EDUCATION/TRAINING PROGRAM

## 2023-08-30 PROCEDURE — 99406 BEHAV CHNG SMOKING 3-10 MIN: CPT | Performed by: HOSPITALIST

## 2023-08-30 PROCEDURE — 700101 HCHG RX REV CODE 250

## 2023-08-30 PROCEDURE — 94669 MECHANICAL CHEST WALL OSCILL: CPT

## 2023-08-30 PROCEDURE — 84100 ASSAY OF PHOSPHORUS: CPT

## 2023-08-30 PROCEDURE — 700111 HCHG RX REV CODE 636 W/ 250 OVERRIDE (IP): Performed by: HOSPITALIST

## 2023-08-30 RX ORDER — HYDROMORPHONE HYDROCHLORIDE 1 MG/ML
0.5 INJECTION, SOLUTION INTRAMUSCULAR; INTRAVENOUS; SUBCUTANEOUS
Status: DISCONTINUED | OUTPATIENT
Start: 2023-08-30 | End: 2023-09-01 | Stop reason: HOSPADM

## 2023-08-30 RX ORDER — METOPROLOL SUCCINATE 25 MG/1
25 TABLET, EXTENDED RELEASE ORAL
Status: DISCONTINUED | OUTPATIENT
Start: 2023-08-30 | End: 2023-09-01 | Stop reason: HOSPADM

## 2023-08-30 RX ORDER — ACETAMINOPHEN 325 MG/1
650 TABLET ORAL EVERY 4 HOURS PRN
Status: DISCONTINUED | OUTPATIENT
Start: 2023-08-30 | End: 2023-08-30

## 2023-08-30 RX ORDER — HYDROMORPHONE HYDROCHLORIDE 1 MG/ML
0.5 INJECTION, SOLUTION INTRAMUSCULAR; INTRAVENOUS; SUBCUTANEOUS ONCE
Status: ACTIVE | OUTPATIENT
Start: 2023-08-30 | End: 2023-08-31

## 2023-08-30 RX ORDER — HYDROMORPHONE HYDROCHLORIDE 1 MG/ML
.25-.5 INJECTION, SOLUTION INTRAMUSCULAR; INTRAVENOUS; SUBCUTANEOUS
Status: DISCONTINUED | OUTPATIENT
Start: 2023-08-30 | End: 2023-08-30

## 2023-08-30 RX ORDER — GUAIFENESIN 600 MG/1
600 TABLET, EXTENDED RELEASE ORAL EVERY 12 HOURS
Status: DISCONTINUED | OUTPATIENT
Start: 2023-08-30 | End: 2023-09-01 | Stop reason: HOSPADM

## 2023-08-30 RX ORDER — SPIRONOLACTONE 25 MG/1
25 TABLET ORAL
Status: DISCONTINUED | OUTPATIENT
Start: 2023-08-30 | End: 2023-09-01 | Stop reason: HOSPADM

## 2023-08-30 RX ORDER — ACETAMINOPHEN 500 MG
1000 TABLET ORAL EVERY 6 HOURS
Status: DISCONTINUED | OUTPATIENT
Start: 2023-08-30 | End: 2023-09-01 | Stop reason: HOSPADM

## 2023-08-30 RX ORDER — ACETAMINOPHEN 500 MG
1000 TABLET ORAL EVERY 6 HOURS PRN
Status: DISCONTINUED | OUTPATIENT
Start: 2023-09-04 | End: 2023-09-01 | Stop reason: HOSPADM

## 2023-08-30 RX ORDER — LOSARTAN POTASSIUM 25 MG/1
25 TABLET ORAL
Status: DISCONTINUED | OUTPATIENT
Start: 2023-08-30 | End: 2023-09-01 | Stop reason: HOSPADM

## 2023-08-30 RX ORDER — OXYCODONE HYDROCHLORIDE 10 MG/1
10 TABLET ORAL
Status: DISCONTINUED | OUTPATIENT
Start: 2023-08-30 | End: 2023-08-31

## 2023-08-30 RX ORDER — ATORVASTATIN CALCIUM 80 MG/1
80 TABLET, FILM COATED ORAL EVERY EVENING
Status: DISCONTINUED | OUTPATIENT
Start: 2023-08-31 | End: 2023-09-01 | Stop reason: HOSPADM

## 2023-08-30 RX ORDER — OXYCODONE HYDROCHLORIDE 5 MG/1
5 TABLET ORAL
Status: DISCONTINUED | OUTPATIENT
Start: 2023-08-30 | End: 2023-08-31

## 2023-08-30 RX ORDER — DAPAGLIFLOZIN 10 MG/1
10 TABLET, FILM COATED ORAL DAILY
Status: DISCONTINUED | OUTPATIENT
Start: 2023-08-30 | End: 2023-09-01 | Stop reason: HOSPADM

## 2023-08-30 RX ORDER — MAGNESIUM SULFATE HEPTAHYDRATE 40 MG/ML
4 INJECTION, SOLUTION INTRAVENOUS ONCE
Status: COMPLETED | OUTPATIENT
Start: 2023-08-30 | End: 2023-08-30

## 2023-08-30 RX ADMIN — INSULIN HUMAN 3 UNITS: 100 INJECTION, SOLUTION PARENTERAL at 17:32

## 2023-08-30 RX ADMIN — GUAIFENESIN 600 MG: 600 TABLET, EXTENDED RELEASE ORAL at 17:34

## 2023-08-30 RX ADMIN — HYDROMORPHONE HYDROCHLORIDE 0.5 MG: 1 INJECTION, SOLUTION INTRAMUSCULAR; INTRAVENOUS; SUBCUTANEOUS at 20:39

## 2023-08-30 RX ADMIN — OXYCODONE HYDROCHLORIDE 5 MG: 5 TABLET ORAL at 08:06

## 2023-08-30 RX ADMIN — ACETAMINOPHEN 650 MG: 325 TABLET, FILM COATED ORAL at 00:58

## 2023-08-30 RX ADMIN — SODIUM PHOSPHATE, MONOBASIC, MONOHYDRATE AND SODIUM PHOSPHATE, DIBASIC, ANHYDROUS 15 MMOL: 142; 276 INJECTION, SOLUTION INTRAVENOUS at 09:01

## 2023-08-30 RX ADMIN — LOSARTAN POTASSIUM 25 MG: 25 TABLET, FILM COATED ORAL at 08:41

## 2023-08-30 RX ADMIN — SPIRONOLACTONE 25 MG: 25 TABLET ORAL at 08:46

## 2023-08-30 RX ADMIN — IPRATROPIUM BROMIDE AND ALBUTEROL SULFATE 3 ML: 2.5; .5 SOLUTION RESPIRATORY (INHALATION) at 00:24

## 2023-08-30 RX ADMIN — INSULIN GLARGINE-YFGN 7 UNITS: 100 INJECTION, SOLUTION SUBCUTANEOUS at 17:31

## 2023-08-30 RX ADMIN — INSULIN HUMAN 3 UNITS: 100 INJECTION, SOLUTION PARENTERAL at 01:02

## 2023-08-30 RX ADMIN — HYDROMORPHONE HYDROCHLORIDE 0.5 MG: 1 INJECTION, SOLUTION INTRAMUSCULAR; INTRAVENOUS; SUBCUTANEOUS at 04:48

## 2023-08-30 RX ADMIN — OXYCODONE HYDROCHLORIDE 10 MG: 10 TABLET ORAL at 12:29

## 2023-08-30 RX ADMIN — SENNOSIDES AND DOCUSATE SODIUM 2 TABLET: 50; 8.6 TABLET ORAL at 17:31

## 2023-08-30 RX ADMIN — DAPAGLIFLOZIN 10 MG: 10 TABLET, FILM COATED ORAL at 09:01

## 2023-08-30 RX ADMIN — HYDROMORPHONE HYDROCHLORIDE 0.25 MG: 1 INJECTION, SOLUTION INTRAMUSCULAR; INTRAVENOUS; SUBCUTANEOUS at 00:58

## 2023-08-30 RX ADMIN — MAGNESIUM SULFATE HEPTAHYDRATE 4 G: 4 INJECTION, SOLUTION INTRAVENOUS at 08:12

## 2023-08-30 RX ADMIN — ACETAMINOPHEN 650 MG: 325 TABLET, FILM COATED ORAL at 05:05

## 2023-08-30 RX ADMIN — ASPIRIN 81 MG 81 MG: 81 TABLET ORAL at 05:06

## 2023-08-30 RX ADMIN — LIDOCAINE PATCH 5% 1 PATCH: 700 PATCH TOPICAL at 10:00

## 2023-08-30 RX ADMIN — ACETAMINOPHEN 1000 MG: 500 TABLET, FILM COATED ORAL at 12:29

## 2023-08-30 RX ADMIN — INSULIN HUMAN 3 UNITS: 100 INJECTION, SOLUTION PARENTERAL at 05:11

## 2023-08-30 RX ADMIN — METOPROLOL SUCCINATE 25 MG: 25 TABLET, EXTENDED RELEASE ORAL at 08:41

## 2023-08-30 RX ADMIN — PRASUGREL 10 MG: 10 TABLET, FILM COATED ORAL at 05:05

## 2023-08-30 RX ADMIN — GUAIFENESIN 600 MG: 600 TABLET, EXTENDED RELEASE ORAL at 08:41

## 2023-08-30 RX ADMIN — OXYCODONE HYDROCHLORIDE 10 MG: 10 TABLET ORAL at 17:31

## 2023-08-30 RX ADMIN — ACETAMINOPHEN 1000 MG: 500 TABLET, FILM COATED ORAL at 17:31

## 2023-08-30 RX ADMIN — AMIODARONE HYDROCHLORIDE 0.5 MG/MIN: 1.8 INJECTION, SOLUTION INTRAVENOUS at 00:59

## 2023-08-30 ASSESSMENT — ENCOUNTER SYMPTOMS
ABDOMINAL PAIN: 0
SPUTUM PRODUCTION: 0
VOMITING: 0
HEADACHES: 0
NEUROLOGICAL NEGATIVE: 1
FATIGUE: 1
FOCAL WEAKNESS: 0
SORE THROAT: 0
MUSCULOSKELETAL NEGATIVE: 1
COUGH: 0
CHILLS: 0
BRUISES/BLEEDS EASILY: 0
CARDIOVASCULAR NEGATIVE: 1
SHORTNESS OF BREATH: 0
FEVER: 0
EYES NEGATIVE: 1
RESPIRATORY NEGATIVE: 1
PALPITATIONS: 0
PSYCHIATRIC NEGATIVE: 1
NAUSEA: 0
WEAKNESS: 0
MYALGIAS: 0
GASTROINTESTINAL NEGATIVE: 1
NERVOUS/ANXIOUS: 0
DIZZINESS: 0

## 2023-08-30 ASSESSMENT — FIBROSIS 4 INDEX
FIB4 SCORE: 3.88
FIB4 SCORE: 3.88

## 2023-08-30 ASSESSMENT — PAIN DESCRIPTION - PAIN TYPE
TYPE: ACUTE PAIN

## 2023-08-30 ASSESSMENT — COGNITIVE AND FUNCTIONAL STATUS - GENERAL
DRESSING REGULAR LOWER BODY CLOTHING: A LOT
CLIMB 3 TO 5 STEPS WITH RAILING: A LOT
DRESSING REGULAR UPPER BODY CLOTHING: A LOT
WALKING IN HOSPITAL ROOM: A LOT
DAILY ACTIVITIY SCORE: 13
MOVING TO AND FROM BED TO CHAIR: A LOT
EATING MEALS: A LITTLE
STANDING UP FROM CHAIR USING ARMS: A LOT
TOILETING: A LOT
MOBILITY SCORE: 13
TURNING FROM BACK TO SIDE WHILE IN FLAT BAD: A LITTLE
HELP NEEDED FOR BATHING: A LOT
SUGGESTED CMS G CODE MODIFIER DAILY ACTIVITY: CL
MOVING FROM LYING ON BACK TO SITTING ON SIDE OF FLAT BED: A LOT
PERSONAL GROOMING: A LOT
SUGGESTED CMS G CODE MODIFIER MOBILITY: CL

## 2023-08-30 NOTE — PROGRESS NOTES
Critical Care Progress Note    Date of admission  8/28/2023    Chief Complaint  58 y.o. male with a history of type 2 diabetes who was transferred from [facility] after suffering out of hospital cardiac arrest.  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  He suffered another cardiac arrest on arrival to the outside hospital where he was again defibrillated and ROSC was achieved.  Started on amiodarone and epinephrine, he is an elevated D-dimer and an inconclusive CTA of his chest and so he was given TNK and heparin for possible pulmonary embolism.  Also suffered a nasal fracture from his fall.  No acute STEMI on ECG but he was evaluated by cardiology who recommends coronary angiography when more stable.    Hospital Course  8/28 - Admitted to ICU from OSH after multiple VF arrests.  Evaluated by cardiology    8/29 -Echo with LVEF 40%, normal RV.  Triple-vessel disease and cath, 3x CHIOMA    8/30 - Remains stable, start introducing GDMT for HFrEF, transfer to tele    Interval Problem Update  Reviewed last 24 hour events:  Tmax: 100 F  Diet: Cardiac diet  Vasopressors:  None    Infusions:   Amiodarone    Antibx: None    Intake / Output  Urine Output past 24 hours: 1.3 L    Lab Trends  WBC 29 --> 16 --> 9    K 6.6 --> 5 --> 4  CO2 16 --> 19 --> 22  Glucose 281 --> 173  Cr 2.3 --> 2.1 --> 1.1     --> 232 --> 165   --> 198 --> 139    Troponin 2700 --> 3500 --> 1800    Review of Systems  Review of Systems   Constitutional:  Negative for chills, fever and malaise/fatigue.   HENT:  Negative for congestion and sore throat.    Eyes: Negative.    Respiratory:  Negative for sputum production and shortness of breath.    Cardiovascular:  Positive for chest pain. Negative for palpitations.   Gastrointestinal:  Negative for abdominal pain, nausea and vomiting.   Genitourinary: Negative.    Musculoskeletal: Negative.    Skin: Negative.     Neurological:  Negative for focal weakness and headaches.   All other systems reviewed and are negative.       Vital Signs for last 24 hours   Temp:  [37.6 °C (99.7 °F)-37.8 °C (100 °F)] 37.6 °C (99.7 °F)  Pulse:  [74-91] 77  Resp:  [11-38] 21  BP: (112-145)/(73-97) 129/82  SpO2:  [90 %-99 %] 91 %    Hemodynamic parameters for last 24 hours       Respiratory Information for the last 24 hours       Physical Exam   Physical Exam  Vitals and nursing note reviewed. Exam conducted with a chaperone present.   Constitutional:       General: He is not in acute distress.     Appearance: Normal appearance. He is not ill-appearing.   HENT:      Head: Normocephalic.      Mouth/Throat:      Mouth: Mucous membranes are moist.   Eyes:      Extraocular Movements: Extraocular movements intact.   Cardiovascular:      Rate and Rhythm: Normal rate and regular rhythm.      Pulses: Normal pulses.   Pulmonary:      Effort: Pulmonary effort is normal. No respiratory distress.   Abdominal:      General: There is no distension.      Palpations: Abdomen is soft.      Tenderness: There is no abdominal tenderness. There is no guarding or rebound.   Musculoskeletal:         General: Normal range of motion.      Cervical back: Normal range of motion and neck supple.   Skin:     General: Skin is warm and dry.      Capillary Refill: Capillary refill takes less than 2 seconds.   Neurological:      General: No focal deficit present.      Mental Status: He is alert and oriented to person, place, and time. Mental status is at baseline.         Medications  Current Facility-Administered Medications   Medication Dose Route Frequency Provider Last Rate Last Admin    acetaminophen (Tylenol) tablet 650 mg  650 mg Oral Q4HRS PRN Scotty Mayo M.D.        metoprolol tartrate (Lopressor) tablet 12.5 mg  12.5 mg Oral TWICE DAILY Scotty Mayo M.D.        insulin regular (HumuLIN R,NovoLIN R) injection  3-14 Units Subcutaneous Q6HRS Scotty Mayo M.D.   3  Units at 08/30/23 0511    And    dextrose 50% (D50W) injection 25 g  25 g Intravenous Q15 MIN PRN Scotty Mayo M.D.        lidocaine (Lidoderm) 5 % 1 Patch  1 Patch Transdermal Q24HR Scotty Mayo M.D.   1 Patch at 08/29/23 1107    aspirin (Asa) chewable tab 81 mg  81 mg Enteral Tube DAILY Donovan Bagley M.D.   81 mg at 08/30/23 0506    prasugrel (Effient) tablet 10 mg  10 mg Enteral Tube DAILY Donovan Bagley M.D.   10 mg at 08/30/23 0505    oxyCODONE immediate-release (Roxicodone) tablet 5 mg  5 mg Oral Q4HRS PRN Vera Noonan M.D.   5 mg at 08/29/23 1907    guaiFENesin (Robitussin) 100 MG/5ML liquid 200 mg  10 mL Oral Q4HRS PRN Vera Noonan M.D.   200 mg at 08/29/23 2159    amiodarone (Nexterone) 360 mg/200 mL infusion  0.5 mg/min Intravenous Continuous Trenton Esparza M.D. 16.7 mL/hr at 08/30/23 0059 0.5 mg/min at 08/30/23 0059    Respiratory Therapy Consult   Nebulization Continuous RT Zeke Suggs M.D.        senna-docusate (Pericolace Or Senokot S) 8.6-50 MG per tablet 2 Tablet  2 Tablet Enteral Tube BID Zeke Suggs M.D.        And    polyethylene glycol/lytes (Miralax) PACKET 1 Packet  1 Packet Enteral Tube QDAY PRN Zeke Suggs M.D.        And    magnesium hydroxide (Milk Of Magnesia) suspension 30 mL  30 mL Enteral Tube QDAY PRN Zeke Suggs M.D.        And    bisacodyl (Dulcolax) suppository 10 mg  10 mg Rectal QDAY PRN Zeke Suggs M.D. MD Alert...ICU Electrolyte Replacement per Pharmacy   Other PHARMACY TO DOSE Zeke Suggs M.D.        lidocaine (Xylocaine) 1 % injection 2 mL  2 mL Tracheal Tube Q30 MIN PRN Zeke Suggs M.D.        ipratropium-albuterol (DUONEB) nebulizer solution  3 mL Nebulization Q2HRS PRN (RT) Zeke Suggs M.D.   3 mL at 08/30/23 0024       Fluids    Intake/Output Summary (Last 24 hours) at 8/30/2023 0646  Last data filed at 8/30/2023 0633  Gross per 24 hour   Intake 3320.2 ml   Output 1700 ml   Net 1620.2  ml       Laboratory  Recent Labs     08/28/23 1956 08/29/23  0011   ISTATAPH 7.084* 7.246*   ISTATAPCO2 58.6* 39.7*   ISTATAPO2 136* 267*   ISTATATCO2 19* 18*   APBSEVR9FRM 98 100*   ISTATARTHCO3 17.6 17.3   ISTATARTBE -13* -10*   ISTATTEMP see below 37.8 C   ISTATFIO2 100 80   ISTATSPEC Arterial Arterial   ISTATAPHTC  --  7.235*   [id number]  --  271*         Recent Labs     08/29/23 0559 08/30/23 0055 08/30/23  0450   SODIUM 140 137 136   POTASSIUM 5.1 4.1 4.2   CHLORIDE 108 105 104   CO2 19* 21 22   BUN 33* 23* 22   CREATININE 2.11* 1.20 1.11   MAGNESIUM 1.5  --  1.4*   PHOSPHORUS 3.7  --  2.4*   CALCIUM 8.9 8.8 8.7     Recent Labs     08/28/23 2000 08/29/23 0056 08/29/23 0559 08/30/23 0055 08/30/23  0450   ALTSGPT 278*  --  198*  --  139*   ASTSGOT 335*  --  232*  --  165*   ALKPHOSPHAT 89  --  69  --  64   TBILIRUBIN 0.2  --  0.2  --  0.3   GLUCOSE 313*   < > 281* 170* 173*    < > = values in this interval not displayed.     Recent Labs     08/28/23 2000 08/29/23 0559 08/30/23  0450   WBC 28.8* 16.4* 9.0   NEUTSPOLYS 91.40* 84.40*  --    LYMPHOCYTES 4.30* 8.20*  --    MONOCYTES 3.40 6.40  --    EOSINOPHILS 0.00 0.00  --    BASOPHILS 0.90 0.10  --    ASTSGOT 335* 232* 165*   ALTSGPT 278* 198* 139*   ALKPHOSPHAT 89 69 64   TBILIRUBIN 0.2 0.2 0.3     Recent Labs     08/28/23 2000 08/28/23 2330 08/29/23 0559 08/30/23  0450   RBC 5.38  --  4.68* 4.15*   HEMOGLOBIN 16.5  --  14.6 12.9*   HEMATOCRIT 52.0  --  43.2 39.2*   PLATELETCT 400  --  315 209   PROTHROMBTM 15.7* 15.9*  --   --    APTT 92.8* 29.9  --   --    INR 1.23* 1.26*  --   --        Imaging  X-Ray:  I have personally reviewed the images and compared with prior images. and My impression is: Lines and tubes in appropriate position, mild edema    Assessment/Plan  * Cardiac arrest with ventricular fibrillation (HCC)- (present on admission)  Assessment & Plan  Due to CAD  Cardiology following  Continue heparin drip  Telemetry  Amiodarone  infusion    Maintain normoxia and avoid hyperoxia in the early arrest period and maintain eucapnea.   MAP>65-70  Normothermia  Maintain euglycemia.     Coronary artery disease involving native coronary artery of native heart with unstable angina pectoris ([facility])  Assessment & Plan  Left main was normal  LAD with 95% stenosis midportion  Circumflex with 95% stenosis midportion  RCA with 95% stenosis in the distal portion, RPDA with 70% focal stenosis in midportion    8/29, 3x CHIOMA placed   - LAD, Circumflex, RCA  - RPDA with appropriate flow, no stent required at this time    ACS (acute coronary syndrome) ([facility])  Assessment & Plan  s/p V-fib arrest  Continue amiodarone  HFrEF w/ LV 40%    Cath with triple vessel disease (See CAD)  Continue heparin drip  Aspirin + Prasugrel    Atorvastatin 80 qhs (liver function still elevated, would start tomorrow or day after)  Start low dose metoprolol, increase and add additional GDMT as able    Respiratory failure requiring intubation ([facility])  Assessment & Plan  Self extubated 8/29  Stable    HFrEF (heart failure with reduced ejection fraction) ([facility])  Assessment & Plan  Unclear chronicity, but potentially acute    LVEF 40-45% with apical akinesis  Normal RV size and function  Normal valves    Cardiology following  Start metoprolol today, introduce other GDMT as able    High anion gap metabolic acidosis  Assessment & Plan  Due to shock  Improving  Trend    Type 2 diabetes mellitus with hyperglycemia, without long-term current use of insulin ([facility])  Assessment & Plan  High SSI  A1c 9  Determine needs for glargine    Transaminitis  Assessment & Plan  Improving  Due to shock from cardiac arrest    MARCIE (acute kidney injury) ([facility])  Assessment & Plan  Strict I/Os  Renally dosed medications  Avoid nephrotoxic agents as able  Trend    Hyperkalemia  Assessment & Plan  Resolved  Trend         VTE:   Heparin infusion  Ulcer: H2 Antagonist  Lines: Central Line  Ongoing indication addressed and Waldo  Catheter  Ongoing indication addressed    I have performed a physical exam and reviewed and updated ROS and Plan today (8/30/2023). In review of yesterday's note (8/29/2023), there are no changes except as documented above.     Discussed patient condition and risk of morbidity and/or mortality with Family, RN, RT, Pharmacy, , [employer] [facility] resident, Charge nurse / hot rounds, and cardiology    Ok to transfer patient out of ICU today. [facility] will sign off at transfer. Please call with questions.

## 2023-08-30 NOTE — PROCEDURES
Cardiac Catheterization and Percutaneous Intervention Procedure Report    2023    Indication for procedure: Cardiac arrest, acute coronary syndrome    Procedures:  Coronary angiogram  Left heart catheterization  Successful three-vessel PCI as described below    Recommendations: Guideline directed medical therapy and risk factor management      Coronary arteriograms:  Left main: normal  Left anterior descendin% stenosis in midportion, lesion involves bifurcation of large diagonal branch.  Left circumflex: Continues as large marginal branch, which has 95% stenosis in midportion  Right coronary: 95% stenosis distal RCA, RPDA has focal 70% stenosis in midportion, co-dominant    Left Heart Catheterization:  Left Ventriculogram: Not performed  Left Ventricular EDP: 20 mm Hg   Aortic Valve Gradient: No significant AV gradient noted    Procedure details:  Bradly Daugherty was brought to the cardiac catheterization lab where the right wrist was prepped and draped in the usual manner for cardiac catheterization.  Timeout was performed.  The area was anesthetized with lidocaine and a 5/6 FR sheath was inserted into the right radial artery without difficulty. A #3.5 left Yuko catheter was advanced to the ostia of the Left coronary artery and arteriograms were recorded.   A #4 right Yuko catheter was advanced to the ostia of the right coronary artery and arteriograms were recorded. Aortic valve was crossed using #4 right Yuko catheter left heart catheterization was performed.  Patient underwent percutaneous coronary revascularization as outlined below.  At the completion of the case the sheath was removed and hemostasis achieved utilizing a radial compression band .  Patient was pain-free and hemodynamically stable at the completion of the case.  There were no apparent complications.    Interventional Procedure:   Patient had cardiac arrest, appears frail, appears to have mild anoxic brain injury, he has  straightforward lesions with low syntax score, so I decided to perform multivessel PCI rather than referral to CABG.    EBU guide catheter was used to engage left main, run-through wire was advanced across stenosis in left anterior descending artery.  Lesion was dilated using 3.0 x 20 mm NC balloon.  95% stenosis, bifurcation, lesion length 20 mm, MO-3 flow before and after.  A 3.0 x 28 mm Synergy drug-eluting stent was placed in mid LAD, proximal portion of the stent is postdilated using 3.5 x 15 mm NC balloon.  Diagonal branch was recrossed, stent strut expanded using 2.25 x 12 mm compliant balloon.  At the end excellent flow into distal LAD, diagonal branches.    Then run-through wire redirected into circumflex artery, placed in distal marginal branch.  A 4.0 x 24 mm Synergy drug-eluting stent was placed in mid circumflex artery extending into first marginal branch.  Stent was postdilated using 4.0 x 20 mm NC balloon.  Lesion length was 20 mm, MO-3 flow before and after.  Then run-through wire, EBU 3.5 guide catheter was removed.    JR4 guide catheter was used to engage the right coronary artery, run-through wire was advanced into distal RCA, lesion length was 12 mm, 95% stenosis.  This was treated with 4.0 x 16 mm Synergy drug-eluting stent at 12 blaise pressure with good result.  RPDA has residual 70% stenosis but excellent flow.    Anticoagulant: Heparin  Antiplatelet: Prasugrel  EBL <25 cc  Complications: none  Specimens: none  Contrast: 80 cc  Fluorotime : see cath lab flowsheet      Sedation: I supervised moderate sedation over a trained independent observer.    Sedation start time: 16:45  End time: 17:34      Electronically signed by   Donovan Bagley M.D., MADHURI  Interventional cardiologist  8/29/2023  5:40 PM

## 2023-08-30 NOTE — ASSESSMENT & PLAN NOTE
Secondary to CPR    Continue Lidoderm patch  Encourage incentive spirometry use and mobilization  Developed rash to oxycodone  Hydrocodone did not help much  Trialing ibuprofen

## 2023-08-30 NOTE — CARE PLAN
"The patient is Stable - Low risk of patient condition declining or worsening    Shift Goals  Clinical Goals: Assess neuro status, hemodynamic stability, monitor for arrhythmias  Patient Goals: drink water  Family Goals: \"get better\"    Progress made toward(s) clinical / shift goals:    Problem: Pain - Standard  Goal: Alleviation of pain or a reduction in pain to the patient’s comfort goal  Description: Target End Date:  Prior to discharge or change in level of care    Document on Vitals flowsheet    1.  Document pain using the appropriate pain scale per order or unit policy  2.  Educate and implement non-pharmacologic comfort measures (i.e. relaxation, distraction, massage, cold/heat therapy, etc.)  3.  Pain management medications as ordered  4.  Reassess pain after pain med administration per policy  5.  If opiods administered assess patient's response to pain medication is appropriate per POSS sedation scale  6.  Follow pain management plan developed in collaboration with patient and interdisciplinary team (including palliative care or pain specialists if applicable)  Outcome: Progressing  Note: Pain assessed, interventions given, interventions reassessed  Pain escalated       Problem: Psychosocial  Goal: Patient's level of anxiety will decrease  Description: Target End Date:  1-3 days or as soon as patient condition allows    1.  Collaborate with patient and family/caregiver to identify triggers and develop strategies to cope with anxiety  2.  Implement stimuli reduction, calming techniques  3.  Pharmacologic management per provider order  4.  Encourage patient/family/care giver participation  5.  Collaborate with interdisciplinary team including Psychologist or Behavioral Health Team as needed  Outcome: Progressing  Note: Provided reassurance and reorientation       Problem: Respiratory  Goal: Patient will achieve/maintain optimum respiratory ventilation and gas exchange  Description: Target End Date:  Prior to " discharge or change in level of care    Document on Assessment flowsheet    1.  Assess and monitor rate, rhythm, depth and effort of respiration  2.  Breath sounds assessed qshift and/or as needed  3.  Assess O2 saturation, administer/titrate oxygen as ordered  4.  Position patient for maximum ventilatory efficiency  5.  Turn, cough, and deep breath with splinting to improve effectiveness  6.  Collaborate with RT to administer medication/treatments per order  7.  Encourage use of incentive spirometer and encourage patient to cough after use and utilize splinting techniques if applicable  8.  Airway suctioning  9.  Monitor sputum production for changes in color, consistency and frequency  10. Perform frequent oral hygiene  11. Alternate physical activity with rest periods  Outcome: Progressing  Note: Encouraged patient to cough out sputum       Patient is not progressing towards the following goals:

## 2023-08-30 NOTE — ASSESSMENT & PLAN NOTE
Patient counseled on importance of smoking cessation  Ordered nicotine gum.  He says the patch makes him angry   show

## 2023-08-30 NOTE — DIETARY
Nutrition Services: Heart Healthy Diet Education Consult   Day 2 of admit.  Bradly Daugherty is a 58 y.o. male with admitting DX of Cardiac arrest     RD able to visit pt at bedside to provide heart healthy education. RD discussed saturated vs unsaturated fats, sodium, fiber and eating a balanced meal, provided handout reinforcing topics discussed. Pt demonstrated readiness and evidence of learning. RD able to answer all questions to patient's satisfaction.     No other education needs identified at this time. Consider referral to outpatient nutrition services for continuation of education as indicated or per pt preferences.     Please re-consult RD as indicated.

## 2023-08-30 NOTE — PROGRESS NOTES
-Received report from Jalen BELTRÁN  - Patient transferred to Telemetry from [facility].  -Assumed patients care.  -Assessment performed and done  -VSS  -Patient is alert and oriented x 4  -In no apparent distress  -Denies chest pain. SOB and N/V  -Reports 7/10 pain chest pain.  -Telemetry box on. Rate and rhythm verified.   -Patient and visitors educated on the use of call light, communicating with the staff on patient needs and concerns.   -Provided time to answer pt questions and address concerns.    -Proper hand hygiene before and after patient care to ensure patient will remain free from infection.     -Patient educated on POC for the day, upcoming tests, and medication information.   -Patient educated on safety precautions and safety equipment. Bed in low position, call light within reach, and hourly rounding conducted.    -Will continue to answer questions and educate patient and visitors as necessary  -No further needs at this time, will continue to monitor.

## 2023-08-30 NOTE — ASSESSMENT & PLAN NOTE
Left main was normal  LAD with 95% stenosis midportion  Circumflex with 95% stenosis midportion  RCA with 95% stenosis in the distal portion, RPDA with 70% focal stenosis in midportion    8/29, 3x CHIOMA placed   - LAD, Circumflex, RCA  - RPDA with appropriate flow, no stent required at this time

## 2023-08-30 NOTE — PROGRESS NOTES
Cardiology Follow Up Progress Note    Date of Service  8/30/2023    Attending Physician  Pierre Jaime M.D.    Chief Complaint   Out of hospital arrest, VT, acute coronary syndrome, coronary artery disease status post stent placements, ischemic cardiomyopathy.    LUCILLE Daugherty is a 58 y.o. male admitted 8/28/2023 with above.    Interim Events  He underwent cardiac catheterization as below.     Review of Systems  Review of Systems   Constitutional:  Positive for fatigue. Negative for chills and fever.   HENT: Negative.  Negative for hearing loss.    Eyes: Negative.    Respiratory: Negative.  Negative for cough and shortness of breath.    Cardiovascular: Negative.  Negative for chest pain, palpitations and leg swelling.   Gastrointestinal: Negative.  Negative for abdominal pain, nausea and vomiting.   Genitourinary: Negative.  Negative for dysuria and urgency.   Musculoskeletal: Negative.  Negative for myalgias.   Skin: Negative.  Negative for rash.   Neurological: Negative.  Negative for dizziness, weakness and headaches.   Hematological:  Does not bruise/bleed easily.   Psychiatric/Behavioral: Negative.  The patient is not nervous/anxious.        Vital signs in last 24 hours  Temp:  [37.2 °C (99 °F)-37.8 °C (100 °F)] 37.2 °C (99 °F)  Pulse:  [74-91] 84  Resp:  [8-36] 18  BP: (115-145)/(77-97) 124/77  SpO2:  [89 %-97 %] 90 %    Physical Exam  Physical Exam  Constitutional:       Appearance: Normal appearance. He is well-developed and normal weight.   HENT:      Head: Normocephalic and atraumatic.      Mouth/Throat:      Mouth: Mucous membranes are moist.   Eyes:      Extraocular Movements: Extraocular movements intact.      Conjunctiva/sclera: Conjunctivae normal.   Cardiovascular:      Rate and Rhythm: Normal rate and regular rhythm.      Pulses: Normal pulses.      Heart sounds: Normal heart sounds.   Pulmonary:      Effort: Pulmonary effort is normal.      Breath sounds: Rhonchi present.   Abdominal:  "     General: Bowel sounds are normal.      Palpations: Abdomen is soft.   Musculoskeletal:         General: Normal range of motion.      Cervical back: Normal range of motion and neck supple.   Skin:     General: Skin is warm and dry.   Neurological:      General: No focal deficit present.      Mental Status: He is alert and oriented to person, place, and time. Mental status is at baseline.   Psychiatric:         Mood and Affect: Mood normal.         Behavior: Behavior normal.         Thought Content: Thought content normal.         Judgment: Judgment normal.         Lab Review  Lab Results   Component Value Date/Time    WBC 9.0 08/30/2023 04:50 AM    RBC 4.15 (L) 08/30/2023 04:50 AM    HEMOGLOBIN 12.9 (L) 08/30/2023 04:50 AM    HEMATOCRIT 39.2 (L) 08/30/2023 04:50 AM    MCV 94.5 08/30/2023 04:50 AM    MCH 31.1 08/30/2023 04:50 AM    MCHC 32.9 08/30/2023 04:50 AM    MPV 9.8 08/30/2023 04:50 AM      Lab Results   Component Value Date/Time    SODIUM 136 08/30/2023 04:50 AM    POTASSIUM 4.2 08/30/2023 04:50 AM    CHLORIDE 104 08/30/2023 04:50 AM    CO2 22 08/30/2023 04:50 AM    GLUCOSE 173 (H) 08/30/2023 04:50 AM    BUN 22 08/30/2023 04:50 AM    CREATININE 1.11 08/30/2023 04:50 AM      Lab Results   Component Value Date/Time    ASTSGOT 165 (H) 08/30/2023 04:50 AM    ALTSGPT 139 (H) 08/30/2023 04:50 AM     Lab Results   Component Value Date/Time    TROPONINT 1785 (H) 08/30/2023 04:50 AM       No results for input(s): \"NTPROBNP\" in the last 72 hours.  (Above labs reviewed.)       Current Facility-Administered Medications:     sodium phosphate 15 mmol in dextrose 5% 250 mL ivpb, 15 mmol, Intravenous, Once, Timmy Lucio D.O., Last Rate: 41.7 mL/hr at 08/30/23 0901, 15 mmol at 08/30/23 0901    metoprolol SR (Toprol XL) tablet 25 mg, 25 mg, Oral, Q DAY, Kishore Fermin M.D., 25 mg at 08/30/23 0841    losartan (Cozaar) tablet 25 mg, 25 mg, Oral, Q DAY, Kishore Fermin M.D., 25 mg at 08/30/23 0841    dapagliflozin " propanediol (Farxiga) tablet 10 mg, 10 mg, Oral, DAILY, Kishore Fermin M.D., 10 mg at 08/30/23 0901    spironolactone (Aldactone) tablet 25 mg, 25 mg, Oral, Q DAY, Kishore Fermin M.D., 25 mg at 08/30/23 0846    guaiFENesin ER (Mucinex) tablet 600 mg, 600 mg, Oral, Q12HRS, Timmy Lucio D.O., 600 mg at 08/30/23 0841    [START ON 8/31/2023] atorvastatin (Lipitor) tablet 80 mg, 80 mg, Oral, Q EVENING, Scotty Mayo M.D.    Pharmacy Consult Request ...Pain Management Review 1 Each, 1 Each, Other, PHARMACY TO DOSE, Pierre Jaime M.D.    acetaminophen (Tylenol) tablet 1,000 mg, 1,000 mg, Oral, Q6HRS, 1,000 mg at 08/30/23 1229 **FOLLOWED BY** [START ON 9/4/2023] acetaminophen (Tylenol) tablet 1,000 mg, 1,000 mg, Oral, Q6HRS PRN, Pierre Jaime M.D.    oxyCODONE immediate-release (Roxicodone) tablet 5 mg, 5 mg, Oral, Q3HRS PRN **OR** oxyCODONE immediate release (Roxicodone) tablet 10 mg, 10 mg, Oral, Q3HRS PRN, 10 mg at 08/30/23 1229 **OR** HYDROmorphone (Dilaudid) injection 0.5 mg, 0.5 mg, Intravenous, Q3HRS PRN, Pierre Jaime M.D.    insulin GLARGINE (Lantus,Semglee) injection, 7 Units, Subcutaneous, Q EVENING, Pierre Jaime M.D.    insulin regular (HumuLIN R,NovoLIN R) injection, 3-14 Units, Subcutaneous, Q6HRS, 3 Units at 08/30/23 0511 **AND** POC blood glucose manual result, , , Q6H **AND** NOTIFY MD and PharmD, , , Once **AND** Administer 20 grams of glucose (approximately 8 ounces of fruit juice) every 15 minutes PRN FSBG less than 70 mg/dL, , , PRN **AND** dextrose 50% (D50W) injection 25 g, 25 g, Intravenous, Q15 MIN PRN, Scotty Mayo M.D.    lidocaine (Lidoderm) 5 % 1 Patch, 1 Patch, Transdermal, Q24HR, Scotty Mayo M.D., 1 Patch at 08/30/23 1000    aspirin (Asa) chewable tab 81 mg, 81 mg, Enteral Tube, DAILY, Donovan Bagley M.D., 81 mg at 08/30/23 0506    prasugrel (Effient) tablet 10 mg, 10 mg, Enteral Tube, DAILY, Donovan Bagley M.D., 10 mg at 08/30/23 0505     guaiFENesin (Robitussin) 100 MG/5ML liquid 200 mg, 10 mL, Oral, Q4HRS PRN, Vera Noonan M.D., 200 mg at 08/29/23 2159    Respiratory Therapy Consult, , Nebulization, Continuous RT, Zeke Suggs M.D.    senna-docusate (Pericolace Or Senokot S) 8.6-50 MG per tablet 2 Tablet, 2 Tablet, Enteral Tube, BID **AND** polyethylene glycol/lytes (Miralax) PACKET 1 Packet, 1 Packet, Enteral Tube, QDAY PRN **AND** magnesium hydroxide (Milk Of Magnesia) suspension 30 mL, 30 mL, Enteral Tube, QDAY PRN **AND** bisacodyl (Dulcolax) suppository 10 mg, 10 mg, Rectal, QDAY PRN, Zeke Suggs M.D.    lidocaine (Xylocaine) 1 % injection 2 mL, 2 mL, Tracheal Tube, Q30 MIN PRN, Zeke Suggs M.D.    ipratropium-albuterol (DUONEB) nebulizer solution, 3 mL, Nebulization, Q2HRS PRN (RT), Zeke Suggs M.D., 3 mL at 08/30/23 0024  (Medications reviewed.)    Cardiac Imaging and Procedures Review  CARDIAC STUDIES/PROCEDURES:     CARDIAC CATHETERIZATION CONCLUSIONS by Donovan Cox (08/29/23)  Successful three-vessel PCI.  3.0 x 28 mm Synergy drug-eluting stent was placed in mid LAD.  4.0 x 24 mm Synergy drug-eluting stent was placed in mid circumflex artery extending into first marginal branch.  4.0 x 16 mm Synergy drug-eluting stent at 12 blaise pressure with good result.  RPDA has residual 70% stenosis but excellent flow.  (study result reviewed)    ECHOCARDIOGRAM CONCLUSIONS (08/29/23)  No prior study is available for comparison.   The ascending aorta diameter is  3.8 cm.  Normal LV size and thickness with reduced LV systolic function,   estimated LVEF 40-45% with apical akinesis suggestive of underlying   ischemic cardiomyopathy  No LV apical thrombus  Normal RV size and systolic function  No significant valvular abnormalities  No pericardial effusion  Dilated noncollapsible IVC with estimated right atrial pressure of 15 mmHg.    EKG performed on (08/28/23) EKG shows sinus rhythm with non-specific  intra-ventricular conduction delay.     Assessment/Plan  Out of hospital arrest with ventricular tachycardia: He is clinically improving. Amiodarone has been discontinued.  Coronary artery disease with stent placement: He is clinically doing well without recurrence of his angina.  We will continue with current medical care.  Ischemic cardiomyopathy: The overall volume status is adequate. We will continue with current care      Thank you for allowing me to participate in the care of this patient.  I will continue to follow this patient    Please contact me with any questions.    Kishore Fermin M.D.   Cardiologist, [facility]  [phone]

## 2023-08-30 NOTE — PROGRESS NOTES
Patient transported to T714 on tele monitor, 2L NC, Phos and Mag gtts. Patient arrives in stable condition.    Patient's family and belongings at bedside.

## 2023-08-30 NOTE — DOCUMENTATION QUERY
[facility]                                                                       Query Response Note      PATIENT:               MARYANNE QUINTEROS  ACCT #:                  [id number]  MRN:                     [id number]  :                      1965  ADMIT DATE:       2023 7:01 PM  DISCH DATE:          RESPONDING  PROVIDER #:        [id number]           QUERY TEXT:    Shock is documented in the Medical Record.  Please specify the type.    The patient's Clinical Indicators include:  Findings:  PN: high anion gap metabolic acidosis due to shock Blood pressures /50-88 shock from cardiac arrest     Treatment: started on epinephrine     Risk Factors: cardiac arrest     Linda Stewart RN BSN  Clinical Documentation   [email or web address]  Connect via Band[employer] Messenger  Options provided:   -- Cardiogenic shock   -- Shock, unspecified   -- Other explanation, (please specify other explanation)      Query created by: Linda Brown on 2023 4:14 AM    RESPONSE TEXT:    Cardiogenic shock          Electronically signed by:  Scotty Mayo MD 2023 6:03 AM

## 2023-08-30 NOTE — CONSULTS
Hospital Medicine Consultation    Date of Service  8/30/2023    Referring Physician  Scotty Mayo M.D.    Consulting Physician  Pierre Jaime M.D.    Reason for Consultation  Take over medical care    History of Presenting Illness  58 y.o. male who presented 8/28/2023 with history of diabetes tobacco dependence transferred from [facility] after cardiac arrest with ROSC.  Patient she had had witnessed bystander cardiac arrest and had CPR and multiple defibrillations for V-fib arrest.  He had another arrest on arrival to the outside hospital and required CPR and defibrillation with ROSC.  He was started on amiodarone and epinephrine he had an elevated D-dimer and inconclusive CTA of the chest so he received TNK and heparin and suffered nasal fracture from his fall.  He was transferred to our facility and evaluated by cardiology his echocardiogram revealed an EF of 40% with a normal RV.  He underwent cardiac catheterization on 8/29/2023 with triple-vessel disease and underwent 3 drug-eluting stents.    On my evaluation he is complaining of chest wall pain that is severe worse with movement and deep breathing and coughing.  He has been having poor appetite and poor oral intake.  He reports that his pain is not controlled on 5 mg of oxycodone.  He has been smoking 2 packs/day until his hospitalization.  He denies any known prior history of cardiac disease although he reports that he had similar symptoms about a month prior to presentation with chest discomfort.    Review of Systems  Review of Systems   All other systems reviewed and are negative.      Past Medical History   has a past medical history of Diabetes (HCC).    Surgical History   has a past surgical history that includes shoulder surgery (Right).    Family History  Reviewed and not pertinent to the presenting problem    Social History   He smokes 2 packs/day smokes THC denies alcohol  Medications  Current Facility-Administered Medications    Medication Dose Route Frequency Provider Last Rate Last Admin    magnesium sulfate IVPB premix 4 g  4 g Intravenous Once GARDENIA LoweryOMellissa 25 mL/hr at 08/30/23 0812 4 g at 08/30/23 0812    sodium phosphate 15 mmol in dextrose 5% 250 mL ivpb  15 mmol Intravenous Once Timmy Lucio D.O. 41.7 mL/hr at 08/30/23 0901 15 mmol at 08/30/23 0901    metoprolol SR (Toprol XL) tablet 25 mg  25 mg Oral Q DAY Kishore Fermin M.D.   25 mg at 08/30/23 0841    losartan (Cozaar) tablet 25 mg  25 mg Oral Q DAY Kishore Fermin M.D.   25 mg at 08/30/23 0841    dapagliflozin propanediol (Farxiga) tablet 10 mg  10 mg Oral DAILY Kishore Fermin M.D.   10 mg at 08/30/23 0901    spironolactone (Aldactone) tablet 25 mg  25 mg Oral Q DAY Kishore Fermin M.D.   25 mg at 08/30/23 0846    guaiFENesin ER (Mucinex) tablet 600 mg  600 mg Oral Q12HRS Timmy Lucio D.O.   600 mg at 08/30/23 0841    [START ON 8/31/2023] atorvastatin (Lipitor) tablet 80 mg  80 mg Oral Q EVENING Scotty Mayo M.D.        Pharmacy Consult Request ...Pain Management Review 1 Each  1 Each Other PHARMACY TO DOSE Pierre Jaime M.D.        acetaminophen (Tylenol) tablet 1,000 mg  1,000 mg Oral Q6HRS Pierre Jaime M.D.        Followed by    [START ON 9/4/2023] acetaminophen (Tylenol) tablet 1,000 mg  1,000 mg Oral Q6HRS PRN Pierre Jaime M.D.        oxyCODONE immediate-release (Roxicodone) tablet 5 mg  5 mg Oral Q3HRS PRN Pierre Jaime M.D.        Or    oxyCODONE immediate release (Roxicodone) tablet 10 mg  10 mg Oral Q3HRS PRN Pierre Jaime M.D.        Or    HYDROmorphone (Dilaudid) injection 0.5 mg  0.5 mg Intravenous Q3HRS PRN Pierre Jaime M.D.        insulin regular (HumuLIN R,NovoLIN R) injection  3-14 Units Subcutaneous Q6HRS Scotty Mayo M.D.   3 Units at 08/30/23 0511    And    dextrose 50% (D50W) injection 25 g  25 g Intravenous Q15 MIN PRN Scotty Mayo M.D.        lidocaine (Lidoderm) 5 % 1 Patch  1 Patch  Transdermal Q24HR Scotty Mayo M.D.   1 Patch at 08/30/23 1000    aspirin (Asa) chewable tab 81 mg  81 mg Enteral Tube DAILY Donovan Bagley M.D.   81 mg at 08/30/23 0506    prasugrel (Effient) tablet 10 mg  10 mg Enteral Tube DAILY Donovan Bagley M.D.   10 mg at 08/30/23 0505    guaiFENesin (Robitussin) 100 MG/5ML liquid 200 mg  10 mL Oral Q4HRS PRN Vera Noonan M.D.   200 mg at 08/29/23 2159    Respiratory Therapy Consult   Nebulization Continuous RT Zeke Suggs M.D.        senna-docusate (Pericolace Or Senokot S) 8.6-50 MG per tablet 2 Tablet  2 Tablet Enteral Tube BID Zeke Suggs M.D.        And    polyethylene glycol/lytes (Miralax) PACKET 1 Packet  1 Packet Enteral Tube QDAY PRN Zeke Suggs M.D.        And    magnesium hydroxide (Milk Of Magnesia) suspension 30 mL  30 mL Enteral Tube QDAY PRN Zeke Suggs M.D.        And    bisacodyl (Dulcolax) suppository 10 mg  10 mg Rectal QDAY PRN Zeke Suggs M.D.        MD Alert...ICU Electrolyte Replacement per Pharmacy   Other PHARMACY TO DOSE Zeke Suggs M.D.        lidocaine (Xylocaine) 1 % injection 2 mL  2 mL Tracheal Tube Q30 MIN PRN Zeke Suggs M.D.        ipratropium-albuterol (DUONEB) nebulizer solution  3 mL Nebulization Q2HRS PRN (RT) Zeke Suggs M.D.   3 mL at 08/30/23 0024       Allergies  No Known Allergies    Physical Exam  Temp:  [37.2 °C (99 °F)-37.8 °C (100 °F)] 37.2 °C (99 °F)  Pulse:  [74-91] 82  Resp:  [11-29] 23  BP: (112-145)/(78-97) 140/91  SpO2:  [90 %-97 %] 94 %    Physical Exam  Vitals and nursing note reviewed.   Constitutional:       Appearance: He is well-developed. He is not diaphoretic.   HENT:      Head: Normocephalic.      Comments: Frontal ecchymosis     Nose:      Comments: Ecchymosis     Mouth/Throat:      Pharynx: No oropharyngeal exudate.   Eyes:      General: No scleral icterus.        Right eye: No discharge.         Left eye: No discharge.       Conjunctiva/sclera: Conjunctivae normal.   Neck:      Vascular: No JVD.      Trachea: No tracheal deviation.   Cardiovascular:      Rate and Rhythm: Normal rate and regular rhythm.      Heart sounds: No murmur heard.     No friction rub. No gallop.   Pulmonary:      Effort: Pulmonary effort is normal. No respiratory distress.      Breath sounds: No stridor. Rhonchi present. No wheezing.   Chest:      Chest wall: Tenderness present.   Abdominal:      General: There is no distension.      Palpations: Abdomen is soft.      Tenderness: There is no abdominal tenderness. There is no rebound.   Musculoskeletal:         General: No tenderness.      Cervical back: Neck supple.   Skin:     General: Skin is warm and dry.      Nails: There is no clubbing.   Neurological:      Mental Status: He is alert and oriented to person, place, and time.      Cranial Nerves: No cranial nerve deficit.      Motor: No abnormal muscle tone.   Psychiatric:         Behavior: Behavior normal.         Fluids  Date 08/30/23 0700 - 08/31/23 0659   Shift 1349-3828 [id number] 24 Hour Total   INTAKE   Shift Total       OUTPUT   Urine 200   200   Shift Total 200   200   Weight (kg) 104.6 104.6 104.6 104.6       Laboratory  Recent Labs     08/28/23 2000 08/29/23  0559 08/30/23  0450   WBC 28.8* 16.4* 9.0   RBC 5.38 4.68* 4.15*   HEMOGLOBIN 16.5 14.6 12.9*   HEMATOCRIT 52.0 43.2 39.2*   MCV 96.7 92.3 94.5   MCH 30.7 31.2 31.1   MCHC 31.7* 33.8 32.9   RDW 44.6 43.9 44.6   PLATELETCT 400 315 209   MPV 9.6 9.8 9.8     Recent Labs     08/29/23  0559 08/30/23  0055 08/30/23  0450   SODIUM 140 137 136   POTASSIUM 5.1 4.1 4.2   CHLORIDE 108 105 104   CO2 19* 21 22   GLUCOSE 281* 170* 173*   BUN 33* 23* 22   CREATININE 2.11* 1.20 1.11   CALCIUM 8.9 8.8 8.7     Recent Labs     08/28/23 2000 08/28/23  2330   APTT 92.8* 29.9   INR 1.23* 1.26*                 Imaging  US-EXTREMITY VENOUS LOWER BILAT         EC-ECHOCARDIOGRAM COMPLETE W/ CONT   Final Result       DX-CHEST-PORTABLE (1 VIEW)   Final Result         1.  Pulmonary edema and/or infiltrates are identified, which are stable since the prior exam.   2.  Cardiomegaly      DX-ABDOMEN FOR TUBE PLACEMENT   Final Result         1.  Nonspecific bowel gas pattern in the upper abdomen.   2.  Nasogastric tube tip terminates overlying the expected location of the gastric body.      DX-CHEST-PORTABLE (1 VIEW)   Final Result         1.  Mild edema and/or infiltrates, appear somewhat decreased since prior study.   2.  Cardiomegaly      DX-CHEST-PORTABLE (1 VIEW)   Final Result         Intubated.      Diffuse groundglass and airspace opacity throughout both lungs, most in the upper lungs      CL-LEFT HEART CATHETERIZATION WITH POSSIBLE INTERVENTION    (Results Pending)       Assessment/Plan  * Cardiac arrest with ventricular fibrillation (HCC)- (present on admission)  Assessment & Plan  -s/p vfib arrest/PEA  -Secondary to ischemic cardiomyopathy    Echocardiogram reviewed EF 40-45% with apical akinesis     status post cardiac catheterization with Multivessel PCI with PCI to LAD circumflex and RCA  Continue medical therapy with aspirin Effient atorvastatin losartan metoprolol spironolactone and farxiga  Reinforced importance of smoking cessation and complying with medical therapy and lifestyle changes  Cardiology following  Continue telemetry monitoring      Nose fracture  Assessment & Plan  Outpatient follow-up with ENT    Chest wall pain  Assessment & Plan  Secondary to CPR    I ordered scheduled Tylenol as needed oxycodone and Dilaudid  Continue Lidoderm patch  Encourage incentive spirometry use and mobilization      Tobacco dependence  Assessment & Plan  Patient counseled on importance of smoking cessation  Reviewed available options to help him quit  Total time spent counseling on smoking cessation 5 minutes      Obesity  Assessment & Plan  Body mass index is 30.42 kg/m².     ACS (acute coronary syndrome) ([facility])  Assessment  & Plan  Status post cardiac catheterization with multivessel PCI per above  Continue medical therapy    HFrEF (heart failure with reduced ejection fraction) ([facility])  Assessment & Plan  Continue current medical therapy and titrate as tolerated    Type 2 diabetes mellitus with hyperglycemia, with long-term current use of insulin ([facility])  Assessment & Plan  Hemoglobin A1c 9.0  Given hyperglycemia I have ordered Lantus  Continue sliding scale insulin  Monitor CBGs as p.o. intake improves          MARCIE (acute kidney injury) ([facility])  Assessment & Plan  Resolved    Monitor renal function electrolytes    Respiratory failure requiring intubation ([facility])  Assessment & Plan  Tolerated extubation 8/29/2023  Encourage incentive spirometry use and mobilization  RT protocol

## 2023-08-31 ENCOUNTER — PATIENT OUTREACH (OUTPATIENT)
Dept: SCHEDULING | Facility: IMAGING CENTER | Age: 58
End: 2023-08-31
Payer: MEDICARE

## 2023-08-31 LAB
ANION GAP SERPL CALC-SCNC: 11 MMOL/L (ref 7–16)
BUN SERPL-MCNC: 23 MG/DL (ref 8–22)
CALCIUM SERPL-MCNC: 8.6 MG/DL (ref 8.5–10.5)
CHLORIDE SERPL-SCNC: 101 MMOL/L (ref 96–112)
CO2 SERPL-SCNC: 22 MMOL/L (ref 20–33)
CREAT SERPL-MCNC: 0.97 MG/DL (ref 0.5–1.4)
ERYTHROCYTE [DISTWIDTH] IN BLOOD BY AUTOMATED COUNT: 43.3 FL (ref 35.9–50)
GFR SERPLBLD CREATININE-BSD FMLA CKD-EPI: 90 ML/MIN/1.73 M 2
GLUCOSE BLD STRIP.AUTO-MCNC: 123 MG/DL (ref 65–99)
GLUCOSE BLD STRIP.AUTO-MCNC: 131 MG/DL (ref 65–99)
GLUCOSE BLD STRIP.AUTO-MCNC: 160 MG/DL (ref 65–99)
GLUCOSE BLD STRIP.AUTO-MCNC: 181 MG/DL (ref 65–99)
GLUCOSE BLD STRIP.AUTO-MCNC: 188 MG/DL (ref 65–99)
GLUCOSE BLD STRIP.AUTO-MCNC: 189 MG/DL (ref 65–99)
GLUCOSE SERPL-MCNC: 187 MG/DL (ref 65–99)
HCT VFR BLD AUTO: 37.3 % (ref 42–52)
HGB BLD-MCNC: 12.1 G/DL (ref 14–18)
MAGNESIUM SERPL-MCNC: 2 MG/DL (ref 1.5–2.5)
MCH RBC QN AUTO: 30.5 PG (ref 27–33)
MCHC RBC AUTO-ENTMCNC: 32.4 G/DL (ref 32.3–36.5)
MCV RBC AUTO: 94 FL (ref 81.4–97.8)
PHOSPHATE SERPL-MCNC: 2.3 MG/DL (ref 2.5–4.5)
PLATELET # BLD AUTO: 213 K/UL (ref 164–446)
PMV BLD AUTO: 9.9 FL (ref 9–12.9)
POTASSIUM SERPL-SCNC: 4.1 MMOL/L (ref 3.6–5.5)
RBC # BLD AUTO: 3.97 M/UL (ref 4.7–6.1)
SODIUM SERPL-SCNC: 134 MMOL/L (ref 135–145)
WBC # BLD AUTO: 7.3 K/UL (ref 4.8–10.8)

## 2023-08-31 PROCEDURE — 700102 HCHG RX REV CODE 250 W/ 637 OVERRIDE(OP): Performed by: STUDENT IN AN ORGANIZED HEALTH CARE EDUCATION/TRAINING PROGRAM

## 2023-08-31 PROCEDURE — 700102 HCHG RX REV CODE 250 W/ 637 OVERRIDE(OP)

## 2023-08-31 PROCEDURE — A9270 NON-COVERED ITEM OR SERVICE: HCPCS

## 2023-08-31 PROCEDURE — 700102 HCHG RX REV CODE 250 W/ 637 OVERRIDE(OP): Performed by: INTERNAL MEDICINE

## 2023-08-31 PROCEDURE — 82962 GLUCOSE BLOOD TEST: CPT | Mod: 91

## 2023-08-31 PROCEDURE — 84100 ASSAY OF PHOSPHORUS: CPT

## 2023-08-31 PROCEDURE — 700102 HCHG RX REV CODE 250 W/ 637 OVERRIDE(OP): Performed by: HOSPITALIST

## 2023-08-31 PROCEDURE — 85027 COMPLETE CBC AUTOMATED: CPT

## 2023-08-31 PROCEDURE — 94669 MECHANICAL CHEST WALL OSCILL: CPT

## 2023-08-31 PROCEDURE — 99239 HOSP IP/OBS DSCHRG MGMT >30: CPT | Performed by: HOSPITALIST

## 2023-08-31 PROCEDURE — 92526 ORAL FUNCTION THERAPY: CPT

## 2023-08-31 PROCEDURE — 80048 BASIC METABOLIC PNL TOTAL CA: CPT

## 2023-08-31 PROCEDURE — 99232 SBSQ HOSP IP/OBS MODERATE 35: CPT | Performed by: INTERNAL MEDICINE

## 2023-08-31 PROCEDURE — 83735 ASSAY OF MAGNESIUM: CPT

## 2023-08-31 PROCEDURE — 770020 HCHG ROOM/CARE - TELE (206)

## 2023-08-31 PROCEDURE — 700101 HCHG RX REV CODE 250: Performed by: STUDENT IN AN ORGANIZED HEALTH CARE EDUCATION/TRAINING PROGRAM

## 2023-08-31 PROCEDURE — A9270 NON-COVERED ITEM OR SERVICE: HCPCS | Performed by: INTERNAL MEDICINE

## 2023-08-31 PROCEDURE — RXMED WILLOW AMBULATORY MEDICATION CHARGE: Performed by: HOSPITALIST

## 2023-08-31 PROCEDURE — A9270 NON-COVERED ITEM OR SERVICE: HCPCS | Performed by: HOSPITALIST

## 2023-08-31 PROCEDURE — 700101 HCHG RX REV CODE 250: Performed by: HOSPITALIST

## 2023-08-31 PROCEDURE — A9270 NON-COVERED ITEM OR SERVICE: HCPCS | Performed by: STUDENT IN AN ORGANIZED HEALTH CARE EDUCATION/TRAINING PROGRAM

## 2023-08-31 RX ORDER — DIPHENHYDRAMINE HCL 25 MG
25 TABLET ORAL EVERY 4 HOURS PRN
Status: DISCONTINUED | OUTPATIENT
Start: 2023-08-31 | End: 2023-09-01 | Stop reason: HOSPADM

## 2023-08-31 RX ORDER — ATORVASTATIN CALCIUM 80 MG/1
80 TABLET, FILM COATED ORAL EVERY EVENING
Qty: 30 TABLET | Refills: 2 | Status: SHIPPED | OUTPATIENT
Start: 2023-08-31

## 2023-08-31 RX ORDER — NICOTINE 21 MG/24HR
21 PATCH, TRANSDERMAL 24 HOURS TRANSDERMAL
Status: DISCONTINUED | OUTPATIENT
Start: 2023-08-31 | End: 2023-08-31

## 2023-08-31 RX ORDER — DAPAGLIFLOZIN 10 MG/1
10 TABLET, FILM COATED ORAL DAILY
Qty: 30 TABLET | Refills: 2 | Status: SHIPPED | OUTPATIENT
Start: 2023-09-01

## 2023-08-31 RX ORDER — HYDROCODONE BITARTRATE AND ACETAMINOPHEN 5; 325 MG/1; MG/1
1 TABLET ORAL EVERY 6 HOURS PRN
Status: DISCONTINUED | OUTPATIENT
Start: 2023-08-31 | End: 2023-09-01 | Stop reason: HOSPADM

## 2023-08-31 RX ORDER — HYDROCODONE BITARTRATE AND ACETAMINOPHEN 10; 325 MG/1; MG/1
1 TABLET ORAL EVERY 8 HOURS PRN
Qty: 12 TABLET | Refills: 0 | Status: SHIPPED | OUTPATIENT
Start: 2023-08-31 | End: 2023-08-31

## 2023-08-31 RX ORDER — ASPIRIN 81 MG/1
81 TABLET, CHEWABLE ORAL DAILY
Qty: 365 TABLET | Refills: 0 | Status: SHIPPED | OUTPATIENT
Start: 2023-09-01

## 2023-08-31 RX ORDER — HYDROCODONE BITARTRATE AND ACETAMINOPHEN 5; 325 MG/1; MG/1
1-2 TABLET ORAL EVERY 8 HOURS PRN
Qty: 20 TABLET | Refills: 0 | Status: SHIPPED | OUTPATIENT
Start: 2023-08-31 | End: 2023-09-05

## 2023-08-31 RX ORDER — METOPROLOL SUCCINATE 25 MG/1
25 TABLET, EXTENDED RELEASE ORAL DAILY
Qty: 30 TABLET | Refills: 2 | Status: SHIPPED | OUTPATIENT
Start: 2023-09-01

## 2023-08-31 RX ORDER — HYDROCODONE BITARTRATE AND ACETAMINOPHEN 10; 325 MG/1; MG/1
1 TABLET ORAL EVERY 6 HOURS PRN
Status: DISCONTINUED | OUTPATIENT
Start: 2023-08-31 | End: 2023-09-01 | Stop reason: HOSPADM

## 2023-08-31 RX ORDER — LOSARTAN POTASSIUM 25 MG/1
25 TABLET ORAL DAILY
Qty: 30 TABLET | Refills: 2 | Status: SHIPPED | OUTPATIENT
Start: 2023-09-01

## 2023-08-31 RX ORDER — PRASUGREL 10 MG/1
10 TABLET, FILM COATED ORAL DAILY
Qty: 30 TABLET | Refills: 2 | Status: SHIPPED | OUTPATIENT
Start: 2023-09-01

## 2023-08-31 RX ORDER — IBUPROFEN 600 MG/1
600 TABLET ORAL EVERY 6 HOURS PRN
Status: DISCONTINUED | OUTPATIENT
Start: 2023-08-31 | End: 2023-09-01 | Stop reason: HOSPADM

## 2023-08-31 RX ORDER — SPIRONOLACTONE 25 MG/1
25 TABLET ORAL DAILY
Qty: 30 TABLET | Refills: 2 | Status: SHIPPED | OUTPATIENT
Start: 2023-09-01

## 2023-08-31 RX ADMIN — HYDROCODONE BITARTRATE AND ACETAMINOPHEN 1 TABLET: 10; 325 TABLET ORAL at 14:45

## 2023-08-31 RX ADMIN — ACETAMINOPHEN 1000 MG: 500 TABLET, FILM COATED ORAL at 00:21

## 2023-08-31 RX ADMIN — INSULIN GLARGINE-YFGN 7 UNITS: 100 INJECTION, SOLUTION SUBCUTANEOUS at 18:38

## 2023-08-31 RX ADMIN — ACETAMINOPHEN 1000 MG: 500 TABLET, FILM COATED ORAL at 18:41

## 2023-08-31 RX ADMIN — IBUPROFEN 600 MG: 600 TABLET, FILM COATED ORAL at 16:01

## 2023-08-31 RX ADMIN — OXYCODONE HYDROCHLORIDE 5 MG: 5 TABLET ORAL at 04:57

## 2023-08-31 RX ADMIN — INSULIN HUMAN 3 UNITS: 100 INJECTION, SOLUTION PARENTERAL at 23:11

## 2023-08-31 RX ADMIN — ACETAMINOPHEN 1000 MG: 500 TABLET, FILM COATED ORAL at 23:09

## 2023-08-31 RX ADMIN — DAPAGLIFLOZIN 10 MG: 10 TABLET, FILM COATED ORAL at 04:57

## 2023-08-31 RX ADMIN — LOSARTAN POTASSIUM 25 MG: 25 TABLET, FILM COATED ORAL at 04:57

## 2023-08-31 RX ADMIN — SENNOSIDES AND DOCUSATE SODIUM 2 TABLET: 50; 8.6 TABLET ORAL at 04:56

## 2023-08-31 RX ADMIN — SENNOSIDES AND DOCUSATE SODIUM 2 TABLET: 50; 8.6 TABLET ORAL at 18:42

## 2023-08-31 RX ADMIN — ACETAMINOPHEN 1000 MG: 500 TABLET, FILM COATED ORAL at 04:56

## 2023-08-31 RX ADMIN — SPIRONOLACTONE 25 MG: 25 TABLET ORAL at 04:57

## 2023-08-31 RX ADMIN — HYDROCODONE BITARTRATE AND ACETAMINOPHEN 1 TABLET: 10; 325 TABLET ORAL at 08:41

## 2023-08-31 RX ADMIN — INSULIN HUMAN 3 UNITS: 100 INJECTION, SOLUTION PARENTERAL at 18:38

## 2023-08-31 RX ADMIN — DICLOFENAC SODIUM 2 G: 10 GEL TOPICAL at 16:02

## 2023-08-31 RX ADMIN — ATORVASTATIN CALCIUM 80 MG: 80 TABLET, FILM COATED ORAL at 18:41

## 2023-08-31 RX ADMIN — DIPHENHYDRAMINE HYDROCHLORIDE 25 MG: 25 TABLET ORAL at 08:42

## 2023-08-31 RX ADMIN — INSULIN HUMAN 3 UNITS: 100 INJECTION, SOLUTION PARENTERAL at 00:22

## 2023-08-31 RX ADMIN — HYDROCODONE BITARTRATE AND ACETAMINOPHEN 1 TABLET: 5; 325 TABLET ORAL at 21:59

## 2023-08-31 RX ADMIN — METOPROLOL SUCCINATE 25 MG: 25 TABLET, EXTENDED RELEASE ORAL at 04:57

## 2023-08-31 RX ADMIN — LIDOCAINE PATCH 5% 1 PATCH: 700 PATCH TOPICAL at 08:41

## 2023-08-31 RX ADMIN — PRASUGREL 10 MG: 10 TABLET, FILM COATED ORAL at 04:57

## 2023-08-31 RX ADMIN — INSULIN HUMAN 3 UNITS: 100 INJECTION, SOLUTION PARENTERAL at 05:01

## 2023-08-31 RX ADMIN — GUAIFENESIN 600 MG: 600 TABLET, EXTENDED RELEASE ORAL at 04:57

## 2023-08-31 RX ADMIN — ASPIRIN 81 MG 81 MG: 81 TABLET ORAL at 04:57

## 2023-08-31 ASSESSMENT — ENCOUNTER SYMPTOMS
GASTROINTESTINAL NEGATIVE: 1
DIARRHEA: 0
NERVOUS/ANXIOUS: 0
COUGH: 1
NEUROLOGICAL NEGATIVE: 1
CARDIOVASCULAR NEGATIVE: 1
HEADACHES: 0
PSYCHIATRIC NEGATIVE: 1
CHILLS: 0
CONSTIPATION: 0
BRUISES/BLEEDS EASILY: 0
FEVER: 0
RESPIRATORY NEGATIVE: 1
WEAKNESS: 0
ABDOMINAL PAIN: 0
SHORTNESS OF BREATH: 0
NAUSEA: 0
VOMITING: 0
MYALGIAS: 0
WHEEZING: 0
CONSTITUTIONAL NEGATIVE: 1
SHORTNESS OF BREATH: 1
EYES NEGATIVE: 1
DIZZINESS: 0
COUGH: 0
PALPITATIONS: 0
MUSCULOSKELETAL NEGATIVE: 1

## 2023-08-31 ASSESSMENT — PAIN DESCRIPTION - PAIN TYPE
TYPE: ACUTE PAIN
TYPE: CHRONIC PAIN
TYPE: ACUTE PAIN

## 2023-08-31 ASSESSMENT — FIBROSIS 4 INDEX: FIB4 SCORE: 3.81

## 2023-08-31 NOTE — FACE TO FACE
"Face to Face Note  -  Durable Medical Equipment    Kevin Zuleta M.D. - NPI: [id number]  I certify that this patient is under my care and that they had a durable medical equipment(DME)face to face encounter by myself that meets the physician DME face-to-face encounter requirements with this patient on:    Date of encounter:   Patient:                    MRN:                       YOB: 2023  Bradly Daugherty  [id number]  1965     The encounter with the patient was in whole, or in part, for the following medical condition, which is the primary reason for durable medical equipment:  CHF    I certify that, based on my findings, the following durable medical equipment is medically necessary:    Oxygen   HOME O2 Saturation Measurements:(Values must be present for Home Oxygen orders)  Room air sat at rest: 85  Room air sat with amb: 82  With liters of O2: 4, O2 sat at rest with O2: 93  With Liters of O2: 4, O2 sat with amb with O2 : 90  Is the patient mobile?: Yes  If patient feels more short of breath, they can go up to 6 liters per minute and contact healthcare provider.    Supporting Symptoms: The patient requires supplemental oxygen, as the following interventions have been tried with limited or no improvement: \"Ambulation with oximetry.    My Clinical findings support the need for the above equipment due to:  Hypoxia  "

## 2023-08-31 NOTE — DISCHARGE SUMMARY
Hospital Medicine Discharge Note     Admit Date:  8/28/2023       Discharge Date:   8/31/2023    Attending Physician:  Kevin Zuleta M.D.     Diagnoses (includes active and resolved):   Principal Problem:    Cardiac arrest with ventricular fibrillation (HCC) (POA: Yes)  Active Problems:    Respiratory failure requiring intubation (HCC) (POA: Unknown)    MARCIE (acute kidney injury) (HCC) (POA: Unknown)    Transaminitis (POA: Unknown)    Type 2 diabetes mellitus with hyperglycemia, with long-term current use of insulin (HCC) (POA: Unknown)    HFrEF (heart failure with reduced ejection fraction) (HCC) (POA: Unknown)    ACS (acute coronary syndrome) (HCC) (POA: Unknown)    Coronary artery disease involving native coronary artery of native heart with unstable angina pectoris (HCC) (POA: Unknown)    Obesity (POA: Unknown)    Tobacco dependence (POA: Unknown)    Chest wall pain (POA: Unknown)    Nose fracture (POA: Unknown)  Resolved Problems:    Hyperkalemia (POA: Unknown)    High anion gap metabolic acidosis (POA: Unknown)      Hospital Summary (Brief Narrative):       58 y.o. male who presented 8/28/2023 with history of diabetes tobacco dependence transferred from [facility] after cardiac arrest with ROSC.  Had witnessed bystander cardiac arrest and had CPR and multiple defibrillations for V-fib arrest.  He had another arrest on arrival to the outside hospital and required CPR and defibrillation with ROSC.  He was started on amiodarone and epinephrine he had an elevated D-dimer and inconclusive CTA of the chest so he received TNK and heparin and suffered nasal fracture from his fall.  He was transferred to our facility and evaluated by cardiology his echocardiogram revealed an EF of 40% with a normal RV.  He underwent cardiac catheterization on 8/29/2023 with triple-vessel disease and underwent 3 drug-eluting stents.  Patient was admitted and taken by cardiology for cardiac cath.  He had successful three-vessel  PCI as described below.  He was started on GDMT.  He was able to be transferred out of the ICU.  He had no further arrhythmias off of amiodarone.  He continues to have chest wall pain from broken ribs.  His overall volume status was determined to be adequate.  Thus he was able to be discharged home.  However, he would like to appeal his discharge.   The patient met 2-midnight criteria for an inpatient stay at the time of discharge.     Consultants:      Cardiologist Dr. Deleon  Hospitalist Dr. Kacie Gupta    Procedures:        Indication for procedure: Cardiac arrest, acute coronary syndrome     Procedures:  Coronary angiogram  Left heart catheterization  Successful three-vessel PCI as described below     Recommendations: Guideline directed medical therapy and risk factor management     Coronary arteriograms:  Left main: normal  Left anterior descendin% stenosis in midportion, lesion involves bifurcation of large diagonal branch.  Left circumflex: Continues as large marginal branch, which has 95% stenosis in midportion  Right coronary: 95% stenosis distal RCA, RPDA has focal 70% stenosis in midportion, co-dominant    Discharge Medications:           Medication List        START taking these medications        Instructions   aspirin 81 MG Chew chewable tablet  Start taking on: 2023  Commonly known as: Asa   1 Tablet by Enteral Tube route every day.  Dose: 81 mg     atorvastatin 80 MG tablet  Commonly known as: Lipitor   Take 1 Tablet by mouth every evening.  Dose: 80 mg     dapagliflozin propanediol 10 MG Tabs  Start taking on: 2023  Commonly known as: Farxiga   Take 1 Tablet by mouth every day.  Dose: 10 mg     HYDROcodone-acetaminophen 5-325 MG Tabs per tablet  Commonly known as: Norco   Take 1-2 Tablets by mouth every 8 hours as needed (pain) for up to 4 days.  Dose: 1-2 Tablet     losartan 25 MG Tabs  Start taking on: 2023  Commonly known as: Cozaar   Take 1 Tablet by mouth  every day.  Dose: 25 mg     metoprolol SR 25 MG Tb24  Start taking on: September 1, 2023  Commonly known as: Toprol XL   Take 1 Tablet by mouth every day.  Dose: 25 mg     prasugrel 10 MG Tabs  Start taking on: September 1, 2023  Commonly known as: Effient   1 Tablet by Enteral Tube route every day.  Dose: 10 mg     spironolactone 25 MG Tabs  Start taking on: September 1, 2023  Commonly known as: Aldactone   Take 1 Tablet by mouth every day.  Dose: 25 mg            CONTINUE taking these medications        Instructions   albuterol 108 (90 Base) MCG/ACT Aers inhalation aerosol   Inhale 1-2 Puffs every four hours as needed for Shortness of Breath.  Dose: 1-2 Puff     DULoxetine 60 MG Cpep delayed-release capsule  Commonly known as: Cymbalta   Take 60 mg by mouth every day.  Dose: 60 mg     gabapentin 300 MG Caps  Commonly known as: Neurontin   Take 900 mg by mouth 2 times a day.  Dose: 900 mg     Levemir 100 UNIT/ML Soln  Generic drug: insulin detemir   Inject 60 Units under the skin every day.  Dose: 60 Units     metformin 1000 MG tablet  Commonly known as: Glucophage   Take 1,000 mg by mouth 2 times a day with meals.  Dose: 1,000 mg            Disposition:   Discharge home    Activity:   As tolerated    Code status:   Full code    Primary Care Provider:    No primary care provider on file.    Follow up appointment details :      [facility]  [address]  [facility] [address]  [phone]  Go to  Go to your appointment with [facility] on Thursday September 7th 2023 at 10:20    Future Appointments   Date Time Provider Department Center   9/19/2023  1:45 PM WILLA Whatley None       Pending Studies:        None    Time spent on discharge day patient visit: 42 minutes    #################################################    Interval history/exam for day of discharge:    Vitals:    08/31/23 0630 08/31/23 0706 08/31/23 1027 08/31/23 1142   BP:  127/86  128/70    Pulse: 66 70 84 83   Resp: 18 20 20 (!) 24   Temp:  36.2 °C (97.2 °F)  36.5 °C (97.7 °F)   TempSrc:  Temporal  Temporal   SpO2: 97% 97% 91% 93%   Weight:       Height:         Weight/BMI: Body mass index is 30.83 kg/m².  Pulse Oximetry: 93 %, O2 (LPM): 3, O2 Delivery Device: Silicone Nasal Cannula    Most Recent Labs:    Lab Results   Component Value Date/Time    WBC 7.3 08/31/2023 02:15 AM    RBC 3.97 (L) 08/31/2023 02:15 AM    HEMOGLOBIN 12.1 (L) 08/31/2023 02:15 AM    HEMATOCRIT 37.3 (L) 08/31/2023 02:15 AM    MCV 94.0 08/31/2023 02:15 AM    MCH 30.5 08/31/2023 02:15 AM    MCHC 32.4 08/31/2023 02:15 AM    MPV 9.9 08/31/2023 02:15 AM    NEUTSPOLYS 84.40 (H) 08/29/2023 05:59 AM    LYMPHOCYTES 8.20 (L) 08/29/2023 05:59 AM    MONOCYTES 6.40 08/29/2023 05:59 AM    EOSINOPHILS 0.00 08/29/2023 05:59 AM    BASOPHILS 0.10 08/29/2023 05:59 AM      Lab Results   Component Value Date/Time    SODIUM 134 (L) 08/31/2023 02:15 AM    POTASSIUM 4.1 08/31/2023 02:15 AM    CHLORIDE 101 08/31/2023 02:15 AM    CO2 22 08/31/2023 02:15 AM    GLUCOSE 187 (H) 08/31/2023 02:15 AM    BUN 23 (H) 08/31/2023 02:15 AM    CREATININE 0.97 08/31/2023 02:15 AM      Lab Results   Component Value Date/Time    ALTSGPT 139 (H) 08/30/2023 04:50 AM    ASTSGOT 165 (H) 08/30/2023 04:50 AM    ALKPHOSPHAT 64 08/30/2023 04:50 AM    TBILIRUBIN 0.3 08/30/2023 04:50 AM    ALBUMIN 3.0 (L) 08/30/2023 04:50 AM    GLOBULIN 2.9 08/30/2023 04:50 AM    INR 1.26 (H) 08/28/2023 11:30 PM     Lab Results   Component Value Date/Time    PROTHROMBTM 15.9 (H) 08/28/2023 11:30 PM    INR 1.26 (H) 08/28/2023 11:30 PM        Imaging/ Testing:      US-EXTREMITY VENOUS LOWER BILAT         EC-ECHOCARDIOGRAM COMPLETE W/ CONT   Final Result      DX-CHEST-PORTABLE (1 VIEW)   Final Result         1.  Pulmonary edema and/or infiltrates are identified, which are stable since the prior exam.   2.  Cardiomegaly      DX-ABDOMEN FOR TUBE PLACEMENT   Final Result         1.  Nonspecific bowel  gas pattern in the upper abdomen.   2.  Nasogastric tube tip terminates overlying the expected location of the gastric body.      DX-CHEST-PORTABLE (1 VIEW)   Final Result         1.  Mild edema and/or infiltrates, appear somewhat decreased since prior study.   2.  Cardiomegaly      DX-CHEST-PORTABLE (1 VIEW)   Final Result         Intubated.      Diffuse groundglass and airspace opacity throughout both lungs, most in the upper lungs      CL-LEFT HEART CATHETERIZATION WITH POSSIBLE INTERVENTION    (Results Pending)       Instructions:      The patient was instructed to return to the ER in the event of worsening symptoms. I have counseled the patient on the importance of compliance and the patient has agreed to proceed with all medical recommendations and follow up plan indicated above.   The patient understands that all medications come with benefits and risks. Risks may include permanent injury or death and these risks can be minimized with close reassessment and monitoring.

## 2023-08-31 NOTE — DISCHARGE PLANNING
LSW talked to Cy Valerio. Per [facility] in [place], [place]. [facility] in [facility] can supply pt with O2 needs and transfer orders to [facility] in [place].     1346 LSW hard faxed O2 face to face and order to [facility].    1515 Cy with [facility] called this LSW regarding needing additional information; a doctor's note explaining why pt needs O2 and demographic sheet    1522 LSW hard faxed additional information. Pending O2 delivery.

## 2023-08-31 NOTE — CARE PLAN
"The patient is Stable - Low risk of patient condition declining or worsening    Shift Goals  Clinical Goals: Assess neuro status, hemodynamic stability, monitor for arrhythmias  Patient Goals: drink water  Family Goals: \"get better\"    Progress made toward(s) clinical / shift goals:        Problem: Knowledge Deficit - Standard  Goal: Patient and family/care givers will demonstrate understanding of plan of care, disease process/condition, diagnostic tests and medications  Outcome: Progressing     Problem: Pain - Standard  Goal: Alleviation of pain or a reduction in pain to the patient’s comfort goal  Outcome: Progressing     Problem: Skin Integrity  Goal: Skin integrity is maintained or improved  Outcome: Progressing       Patient is not progressing towards the following goals: N/A      "

## 2023-08-31 NOTE — THERAPY
Speech Language Pathology   Daily Treatment     Patient Name: Bradly Daugherty  AGE:  58 y.o., SEX:  male  Medical Record #: [id number]  Date of Service: 8/31/2023      Precautions:  Precautions: Fall Risk         Subjective  Patient sitting up in chair with son at bedside.  Patient reports painful swallow and pain in ribs.  He states he is avoiding the meat because it is too difficult and causes pain.       Assessment  Patient was assessed with breakfast meal.  Patient tolerated up to soft and bite sized textures without pain and with adequate oral intake.  Patient also enjoys the Boost they have been sending him.  Patient tolerated thin without overt s/s of aspiration including consecutive swallows from the straw.  Recommend downgrading to soft bite sized with thin liquids.  Recommend medications whole in puree/applesauce if causing pain when taking.        Clinical Impressions  Patient presents with functional oropharyngeal swallow, however c/o pain with swallow likely due to self-extubation.  Patient is avoiding meat and more difficult to chew items due to this.  Recommend downgrading to soft and bite size for comfort until patient's pain improves.  Patient and his son are in agreement with this plan.        Recommendations  Treatment Completed: Dysphagia Treatment       Dysphagia Treatment  Diet Consistency: Soft Bite sized/Thin  Instrumentation: None indicated at this time  Medication: As tolerated, Whole with liquid  Supervision: Close supervision - patient may be left alone for less than 5 minutes at a time  Positioning: Fully upright and midline during oral intake, Meals sitting upright in a chair, as tolerated, Remain upright for 30 minutes after oral intake  Risk Management : Small bites/sips, Slow rate of intake, Alternate bites and sips  Oral Care: BID                     SLP Treatment Plan  Treatment Plan: Dysphagia Treatment, Patient/Family/Caregiver Training  SLP Frequency: 2x Per Week  Estimated Duration:  "Until Therapy Goals Met      Anticipated Discharge Needs  Discharge Recommendations: Anticipate that the patient will have no further speech therapy needs after discharge from the hospital         Patient / Family Goals  Patient / Family Goal #1: \"Water feels so good\"  Goal #1 Outcome: Progressing as expected  Short Term Goals  Short Term Goal # 1: Pt will consume diet of regular solids/thin liquids with no overt s/sx of aspiration or change in pulmonary status  Goal Outcome # 1: Goal not met  Short Term Goal # 2: Patient will consume soft bite sized with thin liquids without overt s/s of airway invasion.      Zohra Henning, SLP  "

## 2023-08-31 NOTE — DISCHARGE PLANNING
Anticipate post acute services to facilitate a successful transition to community. Please consider a PM&R referral to assist with post acute care discharge planning.

## 2023-08-31 NOTE — DISCHARGE PLANNING
Care Transition Team Assessment    LMSW met with pt at bedside to complete assessment. Pt A&Ox4 and able to verify the information on the face sheet. Pt's spouse and daughter are at bedside. Pt lives with his spouse and four children in [facility]. Prior to this hospitalization pt was independent at home with ADLs and IADLs. Pt does not use any DME at baseline. Pt's supports are spouse, children, father, friends, coworkers, and others. Per pt, he currently uses marijuana; last use was 4 days ago. Pt has hx of meth use; quit 30 years ago. Per pt, he has depression and anxiety; has been treating it.    Pt and pt's family are not open to post-acute placement or HH right now.    Information Source  Orientation Level: Oriented X4  Information Given By: Patient, Spouse  Informant's Name: Claudio  Who is responsible for making decisions for patient? : Patient    Readmission Evaluation  Is this a readmission?: No    Elopement Risk  Legal Hold: No  Ambulatory or Self Mobile in Wheelchair: No-Not an Elopement Risk  Elopement Risk: Not at Risk for Elopement    Interdisciplinary Discharge Planning  Lives with - Patient's Self Care Capacity: Spouse, Child Less than 18 Years of Age  Support Systems: Spouse / Significant Other, Children, Family Member(s), Friends / Neighbors  Housing / Facility: 1 Story House    Discharge Preparedness  What is your plan after discharge?: Home with help  What are your discharge supports?: Spouse, Child, Other (comment) (Friends, extended family, coworkers)  Prior Functional Level: Ambulatory, Drives Self, Independent with Activities of Daily Living, Independent with Medication Management    Functional Assesment  Prior Functional Level: Ambulatory, Drives Self, Independent with Activities of Daily Living, Independent with Medication Management    Finances  Financial Barriers to Discharge: No  Prescription Coverage: Yes    Psychological Assessment  History of Substance Abuse:  Methamphetamine, Marijuana  Date Last Used - Marijuana: 8/27/23  Date Last Used - Methamphetamine: 1993  History of Psychiatric Problems: Yes    Anticipated Discharge Information  Discharge Disposition: Discharged to home/self care (01)

## 2023-08-31 NOTE — DISCHARGE INSTRUCTIONS
HF Patient Discharge Instructions  Monitor your weight daily, and maintain a weight chart, to track your weight changes.   Activity as tolerated, unless your Doctor has ordered otherwise.   Follow a low fat, low cholesterol, low salt diet unless instructed otherwise by your Doctor. Read the labels on the back of food products and track your intake of fat, cholesterol and salt.   Fluid Restriction No. If a Fluid Restriction has been ordered by your Doctor, measure fluids with a measuring cup to ensure that you are not exceeding the restriction.   No smoking.  Oxygen Yes. If your Doctor has ordered that you wear Oxygen at home, it is important to wear it as ordered.  Did you receive an explanation from staff on the importance of taking each of your medications and why it is necessary to keep taking them unless your doctor says to stop? Yes  Were all of your questions answered about how to manage your heart failure and what to do if you have increased signs and symptoms after you go home? Yes  Do you feel like your heart failure care team involved you in the care treatment plan and allowed you to make decisions regarding your care while in the hospital and addressed any discharge needs you might have? Yes    See the educational handout provided at discharge for more information on monitoring your daily weight, activity and diet. This also explains more about Heart Failure, symptoms of a flare-up and some of the tests that you have undergone.     Warning Signs of a Flare-Up include:  Swelling in the ankles or lower legs.  Shortness of breath, while at rest, or while doing normal activities.   Shortness of breath at night when in bed, or coughing in bed.   Requiring more pillows to sleep at night, or needing to sit up at night to sleep.  Feeling weak, dizzy or fatigued.     When to call your Doctor:  Call [facility] seven days a week from 8:00 a.m. to 8:00 p.m. for medical questions [phone].  Call  your Primary Care Physician or Cardiologist if:   You experience any pain radiating to your jaw or neck.  You have any difficulty breathing.  You experience weight gain of 3 lbs in a day or 5 lbs in a week.   You feel any palpitations or irregular heartbeats.  You become dizzy or lose consciousness.   If you have had an angiogram or had a pacemaker or AICD placed, and experience:  Bleeding, drainage or swelling at the surgical / puncture site.  Fever greater than 100.0 F  Shock from internal defibrillator.  Cool and / or numb extremities.     Please access the [facility] My HF Guide/Heart Failure Interactive Workbook:   [email or web address]

## 2023-08-31 NOTE — DISCHARGE PLANNING
1340  DPA received [employer] appeal request.     1456  Received Choice form at 1217  Agency/Facility Name: [facility]   Referral sent per [employer] form @ [phone]  DPA manually fax requested documentation to [facility] to fax #: [phone]. Case #: [id number]

## 2023-08-31 NOTE — PROGRESS NOTES
Cardiology Follow Up Progress Note    Date of Service  8/31/2023    Attending Physician  Kevin Zuleta M.D.    Chief Complaint   Out of hospital arrest, VT, acute coronary syndrome, coronary artery disease status post multivessel stent placements, ischemic cardiomyopathy.    LUCILLE Daugherty is a 58 y.o. male admitted 8/28/2023 with above.    Interim Events  No significant changes noted from cardiac standpoint within the past 24 hours.    Review of Systems  Review of Systems   Constitutional: Negative.  Negative for chills and fever.   HENT: Negative.  Negative for hearing loss.    Eyes: Negative.    Respiratory: Negative.  Negative for cough and shortness of breath.    Cardiovascular: Negative.  Negative for chest pain, palpitations and leg swelling.   Gastrointestinal: Negative.  Negative for abdominal pain, nausea and vomiting.   Genitourinary: Negative.  Negative for dysuria and urgency.   Musculoskeletal: Negative.  Negative for myalgias.   Skin: Negative.  Negative for rash.   Neurological: Negative.  Negative for dizziness, weakness and headaches.   Hematological:  Does not bruise/bleed easily.   Psychiatric/Behavioral: Negative.  The patient is not nervous/anxious.        Vital signs in last 24 hours  Temp:  [36.2 °C (97.2 °F)-36.9 °C (98.4 °F)] 36.2 °C (97.2 °F)  Pulse:  [66-84] 84  Resp:  [18-20] 20  BP: (105-129)/(68-88) 127/86  SpO2:  [90 %-98 %] 91 %    Physical Exam  Physical Exam  Constitutional:       Appearance: Normal appearance. He is well-developed and normal weight.   HENT:      Head: Normocephalic and atraumatic.      Mouth/Throat:      Mouth: Mucous membranes are moist.   Eyes:      Extraocular Movements: Extraocular movements intact.      Conjunctiva/sclera: Conjunctivae normal.   Cardiovascular:      Rate and Rhythm: Normal rate and regular rhythm.      Pulses: Normal pulses.      Heart sounds: Normal heart sounds.   Pulmonary:      Effort: Pulmonary effort is normal.      Breath sounds:  "Normal breath sounds.   Abdominal:      General: Bowel sounds are normal.      Palpations: Abdomen is soft.   Musculoskeletal:         General: Normal range of motion.      Cervical back: Normal range of motion and neck supple.   Skin:     General: Skin is warm and dry.   Neurological:      General: No focal deficit present.      Mental Status: He is alert and oriented to person, place, and time. Mental status is at baseline.   Psychiatric:         Mood and Affect: Mood normal.         Behavior: Behavior normal.         Thought Content: Thought content normal.         Judgment: Judgment normal.         Lab Review  Lab Results   Component Value Date/Time    WBC 7.3 08/31/2023 02:15 AM    RBC 3.97 (L) 08/31/2023 02:15 AM    HEMOGLOBIN 12.1 (L) 08/31/2023 02:15 AM    HEMATOCRIT 37.3 (L) 08/31/2023 02:15 AM    MCV 94.0 08/31/2023 02:15 AM    MCH 30.5 08/31/2023 02:15 AM    MCHC 32.4 08/31/2023 02:15 AM    MPV 9.9 08/31/2023 02:15 AM      Lab Results   Component Value Date/Time    SODIUM 134 (L) 08/31/2023 02:15 AM    POTASSIUM 4.1 08/31/2023 02:15 AM    CHLORIDE 101 08/31/2023 02:15 AM    CO2 22 08/31/2023 02:15 AM    GLUCOSE 187 (H) 08/31/2023 02:15 AM    BUN 23 (H) 08/31/2023 02:15 AM    CREATININE 0.97 08/31/2023 02:15 AM      Lab Results   Component Value Date/Time    ASTSGOT 165 (H) 08/30/2023 04:50 AM    ALTSGPT 139 (H) 08/30/2023 04:50 AM     Lab Results   Component Value Date/Time    TROPONINT 1785 (H) 08/30/2023 04:50 AM       No results for input(s): \"NTPROBNP\" in the last 72 hours.  (Above labs reviewed.)       Current Facility-Administered Medications:     HYDROcodone-acetaminophen (Norco) 5-325 MG per tablet 1 Tablet, 1 Tablet, Oral, Q6HRS PRN **OR** HYDROcodone/acetaminophen (Norco)  MG per tablet 1 Tablet, 1 Tablet, Oral, Q6HRS PRN, Kevin Zuleta M.D., 1 Tablet at 08/31/23 0841    diphenhydrAMINE (Benadryl) tablet/capsule 25 mg, 25 mg, Oral, Q4HRS PRN, Kevin Zuleta M.D., 25 mg at 08/31/23 0842    " metoprolol SR (Toprol XL) tablet 25 mg, 25 mg, Oral, Q DAY, Kishore Fermin M.D., 25 mg at 08/31/23 0457    losartan (Cozaar) tablet 25 mg, 25 mg, Oral, Q DAY, Kishore Fermin M.D., 25 mg at 08/31/23 0457    dapagliflozin propanediol (Farxiga) tablet 10 mg, 10 mg, Oral, DAILY, Kishore Fermin M.D., 10 mg at 08/31/23 0457    spironolactone (Aldactone) tablet 25 mg, 25 mg, Oral, Q DAY, Kishore Fermin M.D., 25 mg at 08/31/23 0457    guaiFENesin ER (Mucinex) tablet 600 mg, 600 mg, Oral, Q12HRS, DEANA Lowery.DARREL, 600 mg at 08/31/23 0457    atorvastatin (Lipitor) tablet 80 mg, 80 mg, Oral, Q EVENING, Scotty Mayo M.D.    Pharmacy Consult Request ...Pain Management Review 1 Each, 1 Each, Other, PHARMACY TO DOSE, Pierre Jaime M.D.    acetaminophen (Tylenol) tablet 1,000 mg, 1,000 mg, Oral, Q6HRS, 1,000 mg at 08/31/23 0456 **FOLLOWED BY** [START ON 9/4/2023] acetaminophen (Tylenol) tablet 1,000 mg, 1,000 mg, Oral, Q6HRS PRN, iPerre Jaime M.D.    [DISCONTINUED] oxyCODONE immediate-release (Roxicodone) tablet 5 mg, 5 mg, Oral, Q3HRS PRN, 5 mg at 08/31/23 0457 **OR** [DISCONTINUED] oxyCODONE immediate release (Roxicodone) tablet 10 mg, 10 mg, Oral, Q3HRS PRN, 10 mg at 08/30/23 1731 **OR** HYDROmorphone (Dilaudid) injection 0.5 mg, 0.5 mg, Intravenous, Q3HRS PRN, Pierre Jaime M.D., 0.5 mg at 08/30/23 2039    insulin GLARGINE (Lantus,Semglee) injection, 7 Units, Subcutaneous, Q EVENING, Pierre Jaime M.D., 7 Units at 08/30/23 1731    HYDROmorphone (Dilaudid) injection 0.5 mg, 0.5 mg, Intravenous, Once, WILLA Fernández    insulin regular (HumuLIN R,NovoLIN R) injection, 3-14 Units, Subcutaneous, Q6HRS, 3 Units at 08/31/23 0501 **AND** POC blood glucose manual result, , , Q6H **AND** NOTIFY MD and PharmD, , , Once **AND** Administer 20 grams of glucose (approximately 8 ounces of fruit juice) every 15 minutes PRN FSBG less than 70 mg/dL, , , PRN **AND** dextrose 50% (D50W) injection 25 g,  25 g, Intravenous, Q15 MIN PRN, Scotty Mayo M.D.    lidocaine (Lidoderm) 5 % 1 Patch, 1 Patch, Transdermal, Q24HR, Scotty Mayo M.D., 1 Patch at 08/31/23 0841    aspirin (Asa) chewable tab 81 mg, 81 mg, Enteral Tube, DAILY, Donovan Bagley M.D., 81 mg at 08/31/23 0457    prasugrel (Effient) tablet 10 mg, 10 mg, Enteral Tube, DAILY, Donovan Bagley M.D., 10 mg at 08/31/23 0457    guaiFENesin (Robitussin) 100 MG/5ML liquid 200 mg, 10 mL, Oral, Q4HRS PRN, Vera Noonan M.D., 200 mg at 08/29/23 2159    Respiratory Therapy Consult, , Nebulization, Continuous RT, Zeke Suggs M.D.    senna-docusate (Pericolace Or Senokot S) 8.6-50 MG per tablet 2 Tablet, 2 Tablet, Enteral Tube, BID, 2 Tablet at 08/31/23 0456 **AND** polyethylene glycol/lytes (Miralax) PACKET 1 Packet, 1 Packet, Enteral Tube, QDAY PRN **AND** magnesium hydroxide (Milk Of Magnesia) suspension 30 mL, 30 mL, Enteral Tube, QDAY PRN **AND** bisacodyl (Dulcolax) suppository 10 mg, 10 mg, Rectal, QDAY PRN, Zeke Suggs M.D.    ipratropium-albuterol (DUONEB) nebulizer solution, 3 mL, Nebulization, Q2HRS PRN (RT), Zeke Suggs M.D., 3 mL at 08/30/23 0024  (Medications reviewed.)    Cardiac Imaging and Procedures Review  CARDIAC STUDIES/PROCEDURES:     CARDIAC CATHETERIZATION CONCLUSIONS by Donovan Cox (08/29/23)  Successful three-vessel PCI.  3.0 x 28 mm Synergy drug-eluting stent was placed in mid LAD.  4.0 x 24 mm Synergy drug-eluting stent was placed in mid circumflex artery extending into first marginal branch.  4.0 x 16 mm Synergy drug-eluting stent at 12 blaise pressure with good result.  RPDA has residual 70% stenosis but excellent flow.    ECHOCARDIOGRAM CONCLUSIONS (08/29/23)  No prior study is available for comparison.   The ascending aorta diameter is  3.8 cm.  Normal LV size and thickness with reduced LV systolic function,   estimated LVEF 40-45% with apical akinesis suggestive of underlying   ischemic  cardiomyopathy  No LV apical thrombus  Normal RV size and systolic function  No significant valvular abnormalities  No pericardial effusion  Dilated noncollapsible IVC with estimated right atrial pressure of 15 mmHg.     EKG performed on (08/29/23) was reviewed: EKG personally interpreted shows sinus rhythm with diffuse T wave abnormalities.  EKG performed on (08/28/23) EKG shows sinus rhythm with non-specific intra-ventricular conduction delay    Assessment/Plan  Out of hospital arrest with ventricular tachycardia: He has recovered with no further arrhythmias off of amiodarone.  Coronary artery disease with stent placement: He is clinically doing well without recurrence of his angina.  We will continue with current medical care.  Chronic systolic congestive heart failure with ischemic cardiomyopathy: The overall volume status is adequate.     Thank you for allowing me to participate in the care of this patient.  Cardiology will sign off on this patient    Please contact me with any questions.    Kishore Fermin M.D.   Cardiologist, [facility]  [phone]

## 2023-09-01 ENCOUNTER — PHARMACY VISIT (OUTPATIENT)
Dept: PHARMACY | Facility: MEDICAL CENTER | Age: 58
End: 2023-09-01
Payer: COMMERCIAL

## 2023-09-01 VITALS
DIASTOLIC BLOOD PRESSURE: 94 MMHG | BODY MASS INDEX: 29.57 KG/M2 | RESPIRATION RATE: 19 BRPM | OXYGEN SATURATION: 95 % | HEART RATE: 68 BPM | TEMPERATURE: 97.7 F | HEIGHT: 73 IN | SYSTOLIC BLOOD PRESSURE: 137 MMHG | WEIGHT: 223.11 LBS

## 2023-09-01 LAB
ALBUMIN SERPL BCP-MCNC: 3.3 G/DL (ref 3.2–4.9)
BUN SERPL-MCNC: 26 MG/DL (ref 8–22)
CALCIUM ALBUM COR SERPL-MCNC: 9.8 MG/DL (ref 8.5–10.5)
CALCIUM SERPL-MCNC: 9.2 MG/DL (ref 8.5–10.5)
CHLORIDE SERPL-SCNC: 100 MMOL/L (ref 96–112)
CO2 SERPL-SCNC: 26 MMOL/L (ref 20–33)
CREAT SERPL-MCNC: 1.01 MG/DL (ref 0.5–1.4)
EKG IMPRESSION: NORMAL
ERYTHROCYTE [DISTWIDTH] IN BLOOD BY AUTOMATED COUNT: 43 FL (ref 35.9–50)
GFR SERPLBLD CREATININE-BSD FMLA CKD-EPI: 86 ML/MIN/1.73 M 2
GLUCOSE BLD STRIP.AUTO-MCNC: 135 MG/DL (ref 65–99)
GLUCOSE BLD STRIP.AUTO-MCNC: 165 MG/DL (ref 65–99)
GLUCOSE SERPL-MCNC: 151 MG/DL (ref 65–99)
HCT VFR BLD AUTO: 39.5 % (ref 42–52)
HGB BLD-MCNC: 13 G/DL (ref 14–18)
MAGNESIUM SERPL-MCNC: 2.2 MG/DL (ref 1.5–2.5)
MCH RBC QN AUTO: 30.4 PG (ref 27–33)
MCHC RBC AUTO-ENTMCNC: 32.9 G/DL (ref 32.3–36.5)
MCV RBC AUTO: 92.5 FL (ref 81.4–97.8)
PHOSPHATE SERPL-MCNC: 3 MG/DL (ref 2.5–4.5)
PLATELET # BLD AUTO: 260 K/UL (ref 164–446)
PMV BLD AUTO: 9.8 FL (ref 9–12.9)
POTASSIUM SERPL-SCNC: 3.7 MMOL/L (ref 3.6–5.5)
RBC # BLD AUTO: 4.27 M/UL (ref 4.7–6.1)
SODIUM SERPL-SCNC: 136 MMOL/L (ref 135–145)
WBC # BLD AUTO: 6.6 K/UL (ref 4.8–10.8)

## 2023-09-01 PROCEDURE — 83735 ASSAY OF MAGNESIUM: CPT

## 2023-09-01 PROCEDURE — 700102 HCHG RX REV CODE 250 W/ 637 OVERRIDE(OP): Performed by: HOSPITALIST

## 2023-09-01 PROCEDURE — 93005 ELECTROCARDIOGRAM TRACING: CPT | Performed by: INTERNAL MEDICINE

## 2023-09-01 PROCEDURE — A9270 NON-COVERED ITEM OR SERVICE: HCPCS | Performed by: HOSPITALIST

## 2023-09-01 PROCEDURE — 82962 GLUCOSE BLOOD TEST: CPT

## 2023-09-01 PROCEDURE — 80069 RENAL FUNCTION PANEL: CPT

## 2023-09-01 PROCEDURE — 700102 HCHG RX REV CODE 250 W/ 637 OVERRIDE(OP): Performed by: INTERNAL MEDICINE

## 2023-09-01 PROCEDURE — A9270 NON-COVERED ITEM OR SERVICE: HCPCS | Performed by: INTERNAL MEDICINE

## 2023-09-01 PROCEDURE — A9270 NON-COVERED ITEM OR SERVICE: HCPCS

## 2023-09-01 PROCEDURE — 93010 ELECTROCARDIOGRAM REPORT: CPT | Performed by: INTERNAL MEDICINE

## 2023-09-01 PROCEDURE — 85027 COMPLETE CBC AUTOMATED: CPT

## 2023-09-01 PROCEDURE — 700102 HCHG RX REV CODE 250 W/ 637 OVERRIDE(OP)

## 2023-09-01 RX ADMIN — LOSARTAN POTASSIUM 25 MG: 25 TABLET, FILM COATED ORAL at 05:44

## 2023-09-01 RX ADMIN — GUAIFENESIN 600 MG: 600 TABLET, EXTENDED RELEASE ORAL at 05:44

## 2023-09-01 RX ADMIN — SPIRONOLACTONE 25 MG: 25 TABLET ORAL at 05:44

## 2023-09-01 RX ADMIN — ACETAMINOPHEN 1000 MG: 500 TABLET, FILM COATED ORAL at 05:44

## 2023-09-01 RX ADMIN — ASPIRIN 81 MG 81 MG: 81 TABLET ORAL at 05:44

## 2023-09-01 RX ADMIN — PRASUGREL 10 MG: 10 TABLET, FILM COATED ORAL at 05:44

## 2023-09-01 RX ADMIN — METOPROLOL SUCCINATE 25 MG: 25 TABLET, EXTENDED RELEASE ORAL at 05:44

## 2023-09-01 RX ADMIN — SENNOSIDES AND DOCUSATE SODIUM 2 TABLET: 50; 8.6 TABLET ORAL at 05:44

## 2023-09-01 RX ADMIN — IBUPROFEN 600 MG: 600 TABLET, FILM COATED ORAL at 00:36

## 2023-09-01 RX ADMIN — DAPAGLIFLOZIN 10 MG: 10 TABLET, FILM COATED ORAL at 05:44

## 2023-09-01 ASSESSMENT — PAIN DESCRIPTION - PAIN TYPE: TYPE: CHRONIC PAIN

## 2023-09-01 NOTE — DISCHARGE PLANNING
[facility] Transitional Care Coordination    Referral from: Dr. Zuleta    Insurance Provider on Facesheet: [facility]    Potential Rehab Diagnosis: Cardiac    Chart review indicates patient may have on going medical management and may have therapy needs to possibly meet inpatient rehab facility criteria with the goal of returning to community.    D/C support will need to be verified: Father    Physiatry consultation pended per protocol.  TX pending.     Thank you for the referral.

## 2023-09-01 NOTE — PROGRESS NOTES
Hospital Medicine Daily Progress Note    Date of Service  8/31/2023    Chief Complaint  Bradly Daugherty is a 58 y.o. male admitted 8/28/2023 with Out of hospital arrest, VT, acute coronary syndrome, coronary artery disease status post multivessel stent placements, ischemic cardiomyopathy.    Hospital Course  58 y.o. male who presented 8/28/2023 with history of diabetes tobacco dependence transferred from [facility] after cardiac arrest with ROSC.  Had witnessed bystander cardiac arrest and had CPR and multiple defibrillations for V-fib arrest.  He had another arrest on arrival to the outside hospital and required CPR and defibrillation with ROSC.  He was started on amiodarone and epinephrine he had an elevated D-dimer and inconclusive CTA of the chest so he received TNK and heparin and suffered nasal fracture from his fall.  He was transferred to our facility and evaluated by cardiology his echocardiogram revealed an EF of 40% with a normal RV.  He underwent cardiac catheterization on 8/29/2023 with triple-vessel disease and underwent 3 drug-eluting stents.  Patient was admitted and taken by cardiology for cardiac cath.  He had successful three-vessel PCI as described below.  He was started on GDMT.  He was able to be transferred out of the ICU.  He had no further arrhythmias off of amiodarone.  He continues to have chest wall pain from broken ribs.  His overall volume status was determined to be adequate.  Thus he was able to be discharged home.  However, he would like to appeal his discharge.     Interval Problem Update  8/31: Cardiology signed off.  Patient still having chest pain.  Trialing ibuprofen and hydrocodone.  Allergic to oxycodone.  Added Benadryl as well.  We will have RT give him a breathing treatment.  Continue Mucinex.  Order nicotine gum.  Discharge patient but he would like to appeal.  Total time 54 minutes.    I have discussed this patient's plan of care and discharge plan at IDT rounds  today with Case Management, Nursing, Nursing leadership, and other members of the IDT team.    Disposition  The patient is medically cleared for discharge to home or a post-acute facility.      I have placed the appropriate orders for post-discharge needs.    Review of Systems  Review of Systems   Constitutional:  Negative for chills and fever.   Respiratory:  Positive for cough and shortness of breath. Negative for wheezing.    Cardiovascular:  Positive for chest pain.   Gastrointestinal:  Negative for constipation, diarrhea, nausea and vomiting.   Genitourinary:  Negative for dysuria.   Skin:  Positive for itching and rash.   Neurological:  Negative for headaches.        Physical Exam  Temp:  [36.2 °C (97.2 °F)-36.9 °C (98.4 °F)] 36.4 °C (97.5 °F)  Pulse:  [66-84] 83  Resp:  [18-24] 22  BP: (105-129)/(68-88) 122/79  SpO2:  [90 %-98 %] 90 %    Physical Exam  Constitutional:       General: He is not in acute distress.     Appearance: He is well-developed. He is ill-appearing.   HENT:      Head: Normocephalic.   Cardiovascular:      Rate and Rhythm: Normal rate.      Heart sounds:      No gallop.   Pulmonary:      Effort: No respiratory distress.      Breath sounds: Rhonchi and rales present. No wheezing.   Chest:      Chest wall: Tenderness present.   Abdominal:      General: There is no distension.      Tenderness: There is no abdominal tenderness.   Skin:     General: Skin is warm.   Neurological:      Mental Status: He is alert. Mental status is at baseline.         Fluids    Intake/Output Summary (Last 24 hours) at 8/31/2023 1726  Last data filed at 8/31/2023 1100  Gross per 24 hour   Intake 990 ml   Output 1600 ml   Net -610 ml       Laboratory  Recent Labs     08/29/23  0559 08/30/23  0450 08/31/23  0215   WBC 16.4* 9.0 7.3   RBC 4.68* 4.15* 3.97*   HEMOGLOBIN 14.6 12.9* 12.1*   HEMATOCRIT 43.2 39.2* 37.3*   MCV 92.3 94.5 94.0   MCH 31.2 31.1 30.5   MCHC 33.8 32.9 32.4   RDW 43.9 44.6 43.3   PLATELETCT 315 209  213   MPV 9.8 9.8 9.9     Recent Labs     08/30/23  0055 08/30/23  0450 08/31/23  0215   SODIUM 137 136 134*   POTASSIUM 4.1 4.2 4.1   CHLORIDE 105 104 101   CO2 21 22 22   GLUCOSE 170* 173* 187*   BUN 23* 22 23*   CREATININE 1.20 1.11 0.97   CALCIUM 8.8 8.7 8.6     Recent Labs     08/28/23 2000 08/28/23  2330   APTT 92.8* 29.9   INR 1.23* 1.26*               Imaging  US-EXTREMITY VENOUS LOWER BILAT         EC-ECHOCARDIOGRAM COMPLETE W/ CONT   Final Result      DX-CHEST-PORTABLE (1 VIEW)   Final Result         1.  Pulmonary edema and/or infiltrates are identified, which are stable since the prior exam.   2.  Cardiomegaly      DX-ABDOMEN FOR TUBE PLACEMENT   Final Result         1.  Nonspecific bowel gas pattern in the upper abdomen.   2.  Nasogastric tube tip terminates overlying the expected location of the gastric body.      DX-CHEST-PORTABLE (1 VIEW)   Final Result         1.  Mild edema and/or infiltrates, appear somewhat decreased since prior study.   2.  Cardiomegaly      DX-CHEST-PORTABLE (1 VIEW)   Final Result         Intubated.      Diffuse groundglass and airspace opacity throughout both lungs, most in the upper lungs      CL-LEFT HEART CATHETERIZATION WITH POSSIBLE INTERVENTION    (Results Pending)        Assessment/Plan  * Cardiac arrest with ventricular fibrillation (HCC)- (present on admission)  Assessment & Plan  -s/p vfib arrest/PEA  -Secondary to ischemic cardiomyopathy    Echocardiogram reviewed EF 40-45% with apical akinesis     status post cardiac catheterization with Multivessel PCI with PCI to LAD circumflex and RCA  Continue medical therapy with aspirin Effient atorvastatin losartan metoprolol spironolactone and farxiga  Reinforced importance of smoking cessation and complying with medical therapy and lifestyle changes  Cardiology following  Continue telemetry monitoring      Nose fracture  Assessment & Plan  Outpatient follow-up with ENT    Chest wall pain  Assessment & Plan  Secondary to  CPR    Continue Lidoderm patch  Encourage incentive spirometry use and mobilization  Developed rash to oxycodone  Hydrocodone did not help much  Trialing ibuprofen      Tobacco dependence  Assessment & Plan  Patient counseled on importance of smoking cessation  Ordered nicotine gum.  He says the patch makes him angry    Obesity  Assessment & Plan  Body mass index is 30.42 kg/m².     ACS (acute coronary syndrome) ([facility])  Assessment & Plan  Status post cardiac catheterization with multivessel PCI per above  Continue medical therapy    HFrEF (heart failure with reduced ejection fraction) ([facility])  Assessment & Plan  Continue current medical therapy and titrate as tolerated    Type 2 diabetes mellitus with hyperglycemia, with long-term current use of insulin ([facility])  Assessment & Plan  Hemoglobin A1c 9.0  Given hyperglycemia I have ordered Lantus  Continue sliding scale insulin  Monitor CBGs as p.o. intake improves          MARCIE (acute kidney injury) ([facility])  Assessment & Plan  Resolved    Monitor renal function electrolytes    Respiratory failure requiring intubation ([facility])  Assessment & Plan  Tolerated extubation 8/29/2023  Encourage incentive spirometry use and mobilization  RT protocol         VTE prophylaxis:   SCDs/TEDs      I have performed a physical exam and reviewed and updated ROS and Plan today (8/31/2023). In review of yesterday's note (8/30/2023), there are no changes except as documented above.

## 2023-09-01 NOTE — PROGRESS NOTES
Handoff report received from day shift nurse and pt care assumed. Pt is currently resting in bed, A&Ox4 with no signs of distress, on 2L O2 nasal cannula, line traced, and VSS. Tele box on and monitors notified. Call light within reach, bed in lowest and locked position, bed alarm in use, fall precautions in place. Pt educated on use of call light and all needs addressed. Hourly rounding in place.Family at bedside.

## 2023-09-01 NOTE — CARE PLAN
The patient is Stable - Low risk of patient condition declining or worsening    Shift Goals  Clinical Goals: hemodynamically stable, pain management  Patient Goals: pain management  Family Goals: comfort    Progress made toward(s) clinical / shift goals:  yes    Problem: Knowledge Deficit - Standard  Goal: Patient and family/care givers will demonstrate understanding of plan of care, disease process/condition, diagnostic tests and medications  Outcome: Progressing     Problem: Pain - Standard  Goal: Alleviation of pain or a reduction in pain to the patient’s comfort goal  Outcome: Progressing

## 2023-09-01 NOTE — HOSPITAL COURSE
58 y.o. male who presented 8/28/2023 with history of diabetes tobacco dependence transferred from [facility] after cardiac arrest with ROSC.  Had witnessed bystander cardiac arrest and had CPR and multiple defibrillations for V-fib arrest.  He had another arrest on arrival to the outside hospital and required CPR and defibrillation with ROSC.  He was started on amiodarone and epinephrine he had an elevated D-dimer and inconclusive CTA of the chest so he received TNK and heparin and suffered nasal fracture from his fall.  He was transferred to our facility and evaluated by cardiology his echocardiogram revealed an EF of 40% with a normal RV.  He underwent cardiac catheterization on 8/29/2023 with triple-vessel disease and underwent 3 drug-eluting stents.  Patient was admitted and taken by cardiology for cardiac cath.  He had successful three-vessel PCI as described below.  He was started on GDMT.  He was able to be transferred out of the ICU.  He had no further arrhythmias off of amiodarone.  He continues to have chest wall pain from broken ribs.  His overall volume status was determined to be adequate.  Thus he was able to be discharged home.  However, he would like to appeal his discharge.

## 2023-09-01 NOTE — PROGRESS NOTES
Discharged patient home with family member.  Discussed discharge instructions with patient, provided HF packet.  Patient voiced understanding.  PIV removed by floor RN.  Meds to beds, home O2, and all personal belongings sent with patient.

## 2023-09-01 NOTE — PROGRESS NOTES
-Received report from nightshift RN  -Assumed patients care.  -Assessment performed and done  -VSS  -Patient is alert and oriented x 4  -In no apparent distress  -Denies chest pain. SOB and N/V  -Reports 3/10 pain chest pain   -Telemetry box on. Rate and rhythm verified.   -Patient and visitors educated on the use of call light, communicating with the staff on patient needs and concerns.   -Provided time to answer pt questions and address concerns.    -Proper hand hygiene before and after patient care to ensure patient will remain free from infection.     -Patient educated on POC for the day, upcoming tests, and medication information.   -Patient educated on safety precautions and safety equipment. Bed in low position, call light within reach, and hourly rounding conducted.    -Will continue to answer questions and educate patient and visitors as necessary  -No further needs at this time, will continue to monitor.

## 2023-09-01 NOTE — CARE PLAN
Problem: Hyperinflation  Goal: Prevent or improve atelectasis  Description: Target End Date:  3 to 4 days    1. Instruct incentive spirometry usage  2.  Perform hyperinflation therapy as indicated  Outcome: Not Met       Respiratory Update    Treatment modality: PEP (750)  Frequency: QID    Pt tolerating current treatments well with no adverse reactions.

## 2023-09-19 ENCOUNTER — OFFICE VISIT (OUTPATIENT)
Dept: CARDIOLOGY | Facility: MEDICAL CENTER | Age: 58
End: 2023-09-19
Attending: NURSE PRACTITIONER
Payer: MEDICARE

## 2023-09-19 VITALS
HEART RATE: 73 BPM | HEIGHT: 73 IN | OXYGEN SATURATION: 94 % | RESPIRATION RATE: 14 BRPM | SYSTOLIC BLOOD PRESSURE: 108 MMHG | BODY MASS INDEX: 28.76 KG/M2 | WEIGHT: 217 LBS | DIASTOLIC BLOOD PRESSURE: 54 MMHG

## 2023-09-19 DIAGNOSIS — I50.9 HEART FAILURE, NYHA CLASS 2 (HCC): ICD-10-CM

## 2023-09-19 DIAGNOSIS — F17.200 TOBACCO DEPENDENCE: ICD-10-CM

## 2023-09-19 DIAGNOSIS — Z95.5 S/P DRUG ELUTING CORONARY STENT PLACEMENT: ICD-10-CM

## 2023-09-19 DIAGNOSIS — I25.5 ISCHEMIC CARDIOMYOPATHY: ICD-10-CM

## 2023-09-19 DIAGNOSIS — Z79.899 HIGH RISK MEDICATION USE: ICD-10-CM

## 2023-09-19 DIAGNOSIS — R06.02 SOB (SHORTNESS OF BREATH): ICD-10-CM

## 2023-09-19 DIAGNOSIS — I50.20 ACC/AHA STAGE B SYSTOLIC HEART FAILURE (HCC): ICD-10-CM

## 2023-09-19 DIAGNOSIS — I25.10 CORONARY ARTERY DISEASE INVOLVING NATIVE CORONARY ARTERY OF NATIVE HEART WITHOUT ANGINA PECTORIS: ICD-10-CM

## 2023-09-19 PROCEDURE — 99214 OFFICE O/P EST MOD 30 MIN: CPT | Performed by: NURSE PRACTITIONER

## 2023-09-19 PROCEDURE — 99213 OFFICE O/P EST LOW 20 MIN: CPT | Performed by: NURSE PRACTITIONER

## 2023-09-19 PROCEDURE — 3074F SYST BP LT 130 MM HG: CPT | Performed by: NURSE PRACTITIONER

## 2023-09-19 PROCEDURE — 3078F DIAST BP <80 MM HG: CPT | Performed by: NURSE PRACTITIONER

## 2023-09-19 ASSESSMENT — ENCOUNTER SYMPTOMS
SHORTNESS OF BREATH: 1
ORTHOPNEA: 0
CLAUDICATION: 0
COUGH: 0
VOMITING: 1
FEVER: 0
MYALGIAS: 0
DIZZINESS: 0
PALPITATIONS: 0
PND: 0
ABDOMINAL PAIN: 0

## 2023-09-19 ASSESSMENT — FIBROSIS 4 INDEX: FIB4 SCORE: 3.12

## 2023-09-19 NOTE — PROGRESS NOTES
Chief Complaint   Patient presents with    Coronary Artery Disease    Other     F/V Dx:   HFrEF (heart failure with reduced ejection fraction) ([facility])             Shalini Daugherty is a 58 y.o. male who presents today  for follow-up after his cardiac arrest, CAD and heart failure with his girlfriend, Barbie.     New patient in the office.  Patient had an out of hospital cardiac arrest and was transferred from [facility].  Patient received multiple defibrillations for V-fib arrest.  Patient had another arrest on arrival to outside hospital which required CPR and defibrillation with ROSC.  Patient was started on amiodarone and epinephrine.  Testing showed elevated D-dimer and inconclusive CTA of the chest.  Patient did receive TNKase and heparin.  Patient arrived to our facility, echocardiogram showed EF of 40% with normal RV.  Patient underwent a cardiac cath which showed triple-vessel disease and had 3 stents placed.  He was started on GDMT.     Patient comes in the office today reporting he is having some memory loss and sometimes having a hard time concentrating.  He reports developing pneumonia and he was treated with azithromycin 2 times.  Patient did have an episode of vomiting and is unsure if it was related to his azithromycin use.  Patient is reporting trouble sleeping due to the pain/soreness in his chest.  Patient mentions some episodes of shortness of breath.  He denies other chest pain, palpitations, orthopnea, PND, edema or dizziness/lightheadedness.     He is not weighing himself at home.     He reports he quit smoking the day he went into the hospital.     He has a history of diabetes.    Past Medical History:   Diagnosis Date    Diabetes (HCC)      Past Surgical History:   Procedure Laterality Date    SHOULDER SURGERY Right      History reviewed. No pertinent family history.  Social History     Socioeconomic History    Marital status:      Spouse name: Not on file     Number of children: Not on file    Years of education: Not on file    Highest education level: Not on file   Occupational History    Not on file   Tobacco Use    Smoking status: Former     Current packs/day: 0.00     Average packs/day: 2.0 packs/day for 40.7 years (81.3 ttl pk-yrs)     Types: Cigarettes     Start date:      Quit date: 2023     Years since quittin.0    Smokeless tobacco: Never   Substance and Sexual Activity    Alcohol use: Not Currently    Drug use: Never    Sexual activity: Not on file   Other Topics Concern    Not on file   Social History Narrative    Not on file     Social Determinants of Health     Financial Resource Strain: Not on file   Food Insecurity: Not on file   Transportation Needs: Not on file   Physical Activity: Not on file   Stress: Not on file   Social Connections: Not on file   Intimate Partner Violence: Not on file   Housing Stability: Not on file     Allergies   Allergen Reactions    Oxycodone      Diaphoretic and hot flushes.     Outpatient Encounter Medications as of 2023   Medication Sig Dispense Refill    aspirin (ASA) 81 MG Chew Tab chewable tablet 1 Tablet by Enteral Tube route every day. 365 Tablet 0    atorvastatin (LIPITOR) 80 MG tablet Take 1 Tablet by mouth every evening. 30 Tablet 2    dapagliflozin propanediol (FARXIGA) 10 MG Tab Take 1 Tablet by mouth every day. 30 Tablet 2    losartan (COZAAR) 25 MG Tab Take 1 Tablet by mouth every day. 30 Tablet 2    metoprolol SR (TOPROL XL) 25 MG TABLET SR 24 HR Take 1 Tablet by mouth every day. 30 Tablet 2    prasugrel (EFFIENT) 10 MG Tab 1 Tablet by Enteral Tube route every day. 30 Tablet 2    spironolactone (ALDACTONE) 25 MG Tab Take 1 Tablet by mouth every day. 30 Tablet 2    insulin detemir (LEVEMIR) 100 UNIT/ML Solution Inject 60 Units under the skin every day.      DULoxetine (CYMBALTA) 60 MG Cap DR Particles delayed-release capsule Take 60 mg by mouth every day.      gabapentin (NEURONTIN) 300 MG Cap  "Take 900 mg by mouth 2 times a day.      metformin (GLUCOPHAGE) 1000 MG tablet Take 1,000 mg by mouth 2 times a day with meals.      albuterol 108 (90 Base) MCG/ACT Aero Soln inhalation aerosol Inhale 1-2 Puffs every four hours as needed for Shortness of Breath.       No facility-administered encounter medications on file as of 9/19/2023.     Review of Systems   Constitutional:  Positive for malaise/fatigue. Negative for fever.   Respiratory:  Positive for shortness of breath. Negative for cough.    Cardiovascular:  Positive for chest pain (soreness). Negative for palpitations, orthopnea, claudication, leg swelling and PND.   Gastrointestinal:  Positive for vomiting. Negative for abdominal pain.   Musculoskeletal:  Negative for myalgias.   Neurological:  Negative for dizziness.   All other systems reviewed and are negative.             Objective     /54 (BP Location: Left arm, Patient Position: Sitting, BP Cuff Size: Adult)   Pulse 73   Resp 14   Ht 1.854 m (6' 1\")   Wt 98.4 kg (217 lb)   SpO2 94%   BMI 28.63 kg/m²     Physical Exam  Vitals reviewed.   Constitutional:       Appearance: He is well-developed.   HENT:      Head: Normocephalic and atraumatic.   Eyes:      Pupils: Pupils are equal, round, and reactive to light.   Neck:      Vascular: No JVD.   Cardiovascular:      Rate and Rhythm: Normal rate and regular rhythm.      Heart sounds: Normal heart sounds.   Pulmonary:      Effort: Pulmonary effort is normal. No respiratory distress.      Breath sounds: Normal breath sounds. No wheezing or rales.   Chest:      Chest wall: Tenderness present.   Abdominal:      General: Bowel sounds are normal.      Palpations: Abdomen is soft.   Musculoskeletal:         General: Normal range of motion.      Cervical back: Normal range of motion and neck supple.      Right lower leg: No edema.      Left lower leg: No edema.   Skin:     General: Skin is warm and dry.   Neurological:      General: No focal deficit " "present.      Mental Status: He is alert and oriented to person, place, and time.   Psychiatric:         Behavior: Behavior normal.            No results found for: \"CHOLSTRLTOT\", \"LDL\", \"HDL\", \"TRIGLYCERIDE\"    Lab Results   Component Value Date/Time    SODIUM 136 2023 01:28 AM    POTASSIUM 3.7 2023 01:28 AM    CHLORIDE 100 2023 01:28 AM    CO2 26 2023 01:28 AM    GLUCOSE 151 (H) 2023 01:28 AM    BUN 26 (H) 2023 01:28 AM    CREATININE 1.01 2023 01:28 AM     Lab Results   Component Value Date/Time    ALKPHOSPHAT 64 2023 04:50 AM    ASTSGOT 165 (H) 2023 04:50 AM    ALTSGPT 139 (H) 2023 04:50 AM    TBILIRUBIN 0.3 2023 04:50 AM     Cardiac Catheterization and Percutaneous Intervention Procedure Report 2023     Indication for procedure: Cardiac arrest, acute coronary syndrome     Procedures:  Coronary angiogram  Left heart catheterization  Successful three-vessel PCI as described below     Recommendations: Guideline directed medical therapy and risk factor management     Coronary arteriograms:  Left main: normal  Left anterior descendin% stenosis in midportion, lesion involves bifurcation of large diagonal branch.  Left circumflex: Continues as large marginal branch, which has 95% stenosis in midportion  Right coronary: 95% stenosis distal RCA, RPDA has focal 70% stenosis in midportion, co-dominant     Left Heart Catheterization:  Left Ventriculogram: Not performed  Left Ventricular EDP: 20 mm Hg   Aortic Valve Gradient: No significant AV gradient noted      Transthoracic Echo Report 2023  No prior study is available for comparison.   The ascending aorta diameter is  3.8 cm.  Normal LV size and thickness with reduced LV systolic function,   estimated LVEF 40-45% with apical akinesis suggestive of underlying   ischemic cardiomyopathy  No LV apical thrombus  Normal RV size and systolic function  No significant valvular abnormalities  No " pericardial effusion  Dilated noncollapsible IVC with estimated right atrial pressure of 15   mmHg  Paual CHEUNG notified via Voalte on 8/29/23 at 9:13AM    Date 6MWT MLWHF   9/19/2023 Not done due to cardiac arrest 84                        Assessment & Plan     1. Heart failure, NYHA class 2 (HCC)  Basic Metabolic Panel (BMP)    proBrain Natriuretic Peptide, NT    REFERRAL TO CARDIOLOGY - HEART FAILURE NURSING EDUCATION      2. SOB (shortness of breath)  Basic Metabolic Panel (BMP)    proBrain Natriuretic Peptide, NT    REFERRAL TO CARDIOLOGY - HEART FAILURE NURSING EDUCATION      3. ACC/AHA stage B systolic heart failure (HCC)        4. Ischemic cardiomyopathy        5. Coronary artery disease involving native coronary artery of native heart without angina pectoris        6. S/P drug eluting coronary stent placement        7. High risk medication use        8. Tobacco dependence            Medical Decision Making: Today's Assessment/Status/Plan:        Ischemic cardiomyopathy, HFmrEF, stage B, class 1-2, LVEF 40 to 45%: He does mention some shortness of breath, but could be due to his recent pneumonia.   -Evidence Based Beta-blocker: Continue metoprolol SR 25 mg daily, patient encouraged to start taking this at night  -ACE-I/ARB/ARNI: Continue losartan 25 mg daily, recommend that patient could also try to take this at night to help with fatigue  -Aldosterone Antagonist: Continue spironolactone 25 mg daily  -Diuretic: not on diuretic at this time   -SGLT2 inhibitor: Continue Farxiga 10 mg daily  -Other: consider as needed (Hydralazine/Isosorbide, Vericiguat, Corlanor)  -Labs: BMP, NT proBNP-Labs Ordered, to be done in 4 to 6 weeks before next visit, Will continue to closely monitor for side effects of patient's high risk medication(s) including renal function, liver function NTproBNP/cardiac markers, and electrolytes as needed  -discussed importance of exercise and regular activity  -Discussed/reviewed  maintaining a low Sodium and hydration recommendations  -Repeat Echo in 3-6 months, currently ICD not indicated  -Reinforced s/sx of worsening heart failure with patient and weight monitoring. Pt verbalizes understanding. Pt to call office or RTC if present.    -PUMP line number [id number] (PUMP) reviewed with patient  -Heart Failure Education:  Discussed the Definition of heart failure (linking disease, symptoms, and treatment) and causes of heart failure  Patient referred over for formal heart failure education if he can come back to [place] at a later date  Recognition of escalating symptoms and concrete plan for response to particular symptoms  Discussed the indication for ACE-I/ARB/ARNI, Beta-Blocker, Aldosterone antagonist, SGLT2 inhibitor  Risk modification for heart failure progression  Specific diet recommendations: Recommend low-sodium diet, adequate hydration.  Recommend starting exercise program or cardiac rehab  Start monitoring weights at home daily. Call office if weight increasing greater than 3 lbs in 1 day or greater than 5 lbs in 1 week.   Importance of treatment adherence  Behavioral strategies to promote treatment adherence  -Advanced care planning: Advance directive and POLST to be discussed at future visit  -Pharmacotherapy Referral: Not referred as patient lives out of town  -Compliance Barriers: None  -Genetic testing Consideration: None  -Consideration for LVAD and/or Heart transplant as necessary       CAD, CHIOMA x3 to LAD, circumflex and RCA on 8/29/2023:  -Continue aspirin 81 mg daily  -Continue Effient 10 mg daily, DAPT for 1 year  -Continue atorvastatin 80 mg daily  -On SGLT2 inhibitor  -Recommend cardiac rehab, patient unsure if he will be able to participate as he lives in [place].  Patient encouraged to speak to physical therapy to see if they can help with any rehab  -Recommend complete smoking cessation  -Patient can try some Tylenol for chest wall pain, recommend to avoid NSAIDs      Tobacco dependence:  -Continue smoking cessation    FU in clinic in 4 to 6 weeks with labs. Sooner if needed.    Patient verbalizes understanding and agrees with the plan of care.     PLEASE NOTE: This Note was created using voice recognition Software. I have made every reasonable attempt to correct obvious errors, but I expect that there are errors of grammar and possibly content that I did not discover before finalizing the note

## 2023-09-20 ASSESSMENT — MINNESOTA LIVING WITH HEART FAILURE QUESTIONNAIRE (MLHF)
EATING LESS FOODS YOU LIKE: 4
DIFFICULTY WITH RECREATIONAL PASTIMES, SPORTS, HOBBIES: 5
DIFFICULTY SOCIALIZING WITH FAMILY OR FRIENDS: 5
WORKING AROUND THE HOUSE OR YARD DIFFICULT: 4
FEELING LIKE A BURDEN TO FAMILY AND FRIENDS: 5
TIRED, FATIGUED OR LOW ON ENERGY: 5
HAVING TO SIT OR LIE DOWN DURING THE DAY: 4
SWELLING IN ANKLES OR LEGS: 2
TOTAL_SCORE: 83
MAKING YOU FEEL DEPRESSED: 4
MAKING YOU STAY IN A HOSPITAL: 0
LOSS OF SELF CONTROL IN YOUR LIFE: 4
DIFFICULTY WITH SEXUAL ACTIVITIES: 5
WALKING ABOUT OR CLIMBING STAIRS DIFFICULT: 4
COSTING YOU MONEY FOR MEDICAL CARE: 1
GIVING YOU SIDE EFFECTS FROM TREATMENTS: 4
MAKING YOU WORRY: 4
DIFFICULTY TO CONCENTRATE OR REMEMBERING THINGS: 4
DIFFICULTY WORKING TO EARN A LIVING: 5
MAKING YOU SHORT OF BREATH: 5
DIFFICULTY GOING AWAY FROM HOME: 4
DIFFICULTY SLEEPING WELL AT NIGHT: 5

## 2023-10-25 ENCOUNTER — TELEPHONE (OUTPATIENT)
Dept: CARDIOLOGY | Facility: MEDICAL CENTER | Age: 58
End: 2023-10-25

## 2023-10-25 NOTE — TELEPHONE ENCOUNTER
JAIMEE    Caller: Albert Daugherty    Topic/issue: Patient needs to the office to fax over proof that he went to his last OV on  09-. The good fax number would be:  [phone] Attn: Jeanie Santos    Callback Number: [phone]    Thank you,  -Inés ALONSO

## 2023-11-09 ENCOUNTER — APPOINTMENT (OUTPATIENT)
Dept: CARDIOLOGY | Facility: MEDICAL CENTER | Age: 58
End: 2023-11-09
Attending: NURSE PRACTITIONER
Payer: MEDICARE

## 2024-01-04 ENCOUNTER — OFFICE VISIT (OUTPATIENT)
Dept: CARDIOLOGY | Facility: MEDICAL CENTER | Age: 59
End: 2024-01-04
Attending: NURSE PRACTITIONER
Payer: MEDICARE

## 2024-01-04 VITALS
OXYGEN SATURATION: 95 % | WEIGHT: 218 LBS | RESPIRATION RATE: 12 BRPM | HEART RATE: 72 BPM | HEIGHT: 73 IN | SYSTOLIC BLOOD PRESSURE: 100 MMHG | DIASTOLIC BLOOD PRESSURE: 70 MMHG | BODY MASS INDEX: 28.89 KG/M2

## 2024-01-04 DIAGNOSIS — I25.5 ISCHEMIC CARDIOMYOPATHY: ICD-10-CM

## 2024-01-04 DIAGNOSIS — I50.9 HEART FAILURE, NYHA CLASS 1 (HCC): ICD-10-CM

## 2024-01-04 DIAGNOSIS — I25.10 CORONARY ARTERY DISEASE INVOLVING NATIVE CORONARY ARTERY OF NATIVE HEART WITHOUT ANGINA PECTORIS: ICD-10-CM

## 2024-01-04 DIAGNOSIS — Z95.5 S/P DRUG ELUTING CORONARY STENT PLACEMENT: ICD-10-CM

## 2024-01-04 DIAGNOSIS — F17.200 TOBACCO DEPENDENCE: ICD-10-CM

## 2024-01-04 DIAGNOSIS — I50.20 ACC/AHA STAGE B SYSTOLIC HEART FAILURE (HCC): ICD-10-CM

## 2024-01-04 DIAGNOSIS — Z79.899 HIGH RISK MEDICATION USE: ICD-10-CM

## 2024-01-04 DIAGNOSIS — Z71.89 ENCOUNTER FOR EDUCATION ABOUT HEART FAILURE: ICD-10-CM

## 2024-01-04 PROCEDURE — 99214 OFFICE O/P EST MOD 30 MIN: CPT | Performed by: NURSE PRACTITIONER

## 2024-01-04 PROCEDURE — 99213 OFFICE O/P EST LOW 20 MIN: CPT | Performed by: NURSE PRACTITIONER

## 2024-01-04 PROCEDURE — 3078F DIAST BP <80 MM HG: CPT | Performed by: NURSE PRACTITIONER

## 2024-01-04 PROCEDURE — 3074F SYST BP LT 130 MM HG: CPT | Performed by: NURSE PRACTITIONER

## 2024-01-04 ASSESSMENT — ENCOUNTER SYMPTOMS
CLAUDICATION: 0
COUGH: 0
PND: 0
DIZZINESS: 0
MYALGIAS: 0
ORTHOPNEA: 0
ABDOMINAL PAIN: 0
VOMITING: 1
SHORTNESS OF BREATH: 1
PALPITATIONS: 0
FEVER: 0

## 2024-01-04 ASSESSMENT — FIBROSIS 4 INDEX: FIB4 SCORE: 3.12

## 2024-01-04 NOTE — PROGRESS NOTES
Chief Complaint   Patient presents with    Follow-Up     Dx: Heart failure, NYHA class 2       Subjective     Bradly Daugherty is a 58 y.o. male who presents today  for follow-up after his cardiac arrest, CAD and heart failure with his girlfriend, Barbie.     Current patient of the heart failure clinic.  He was last seen in clinic on 9/19/2023.  During that visit, he was sent for follow-up lab testing.  No changes were made to his regimen.    Patient reports he has been doing quite well since his last visit, he does mention having some chest pain when he reaches.  He otherwise denies any tenderness.  He does feel tired.  He denies palpitations, orthopnea, PND, edema, shortness of breath or dizziness/lightheadedness.    He is not weighing himself at home.    He was not able to start cardiac rehab due to living in [place], and there was no opportunity for cardiac rehab where he lives.    Patient also states that he has started smoking again, currently smoking about half pack a day.  He does understand he needs to quit.    Additionally, patient has the following medical problems:     -out of hospital cardiac arrest and was transferred from [facility] to [facility] on 8/28/2023 through 9/1/2023.  Patient received multiple defibrillations for V-fib arrest.  Patient had another arrest on arrival to outside hospital which required CPR and defibrillation with ROSC.  Patient was started on amiodarone and epinephrine.  Testing showed elevated D-dimer and inconclusive CTA of the chest.  Patient did receive TNKase and heparin.  Patient arrived to our facility, echocardiogram showed EF of 40% with normal RV.  Patient underwent a cardiac cath which showed triple-vessel disease and had 3 stents placed.  He was started on GDMT.     -He has a history of diabetes.    Past Medical History:   Diagnosis Date    Diabetes (HCC)      Past Surgical History:   Procedure Laterality Date    SHOULDER SURGERY Right      History reviewed. No  pertinent family history.  Social History     Socioeconomic History    Marital status:      Spouse name: Not on file    Number of children: Not on file    Years of education: Not on file    Highest education level: Not on file   Occupational History    Not on file   Tobacco Use    Smoking status: Every Day     Current packs/day: 0.00     Average packs/day: 2.0 packs/day for 40.7 years (81.3 ttl pk-yrs)     Types: Cigarettes     Start date:      Last attempt to quit: 2023     Years since quittin.3    Smokeless tobacco: Never    Tobacco comments:     1/4 pack daily   Substance and Sexual Activity    Alcohol use: Not Currently    Drug use: Never    Sexual activity: Not on file   Other Topics Concern    Not on file   Social History Narrative    Not on file     Social Determinants of Health     Financial Resource Strain: Not on file   Food Insecurity: Not on file   Transportation Needs: Not on file   Physical Activity: Not on file   Stress: Not on file   Social Connections: Not on file   Intimate Partner Violence: Not on file   Housing Stability: Not on file     Allergies   Allergen Reactions    Oxycodone      Diaphoretic and hot flushes.     Outpatient Encounter Medications as of 2024   Medication Sig Dispense Refill    aspirin (ASA) 81 MG Chew Tab chewable tablet 1 Tablet by Enteral Tube route every day. 365 Tablet 0    atorvastatin (LIPITOR) 80 MG tablet Take 1 Tablet by mouth every evening. 30 Tablet 2    dapagliflozin propanediol (FARXIGA) 10 MG Tab Take 1 Tablet by mouth every day. 30 Tablet 2    losartan (COZAAR) 25 MG Tab Take 1 Tablet by mouth every day. 30 Tablet 2    metoprolol SR (TOPROL XL) 25 MG TABLET SR 24 HR Take 1 Tablet by mouth every day. 30 Tablet 2    prasugrel (EFFIENT) 10 MG Tab 1 Tablet by Enteral Tube route every day. 30 Tablet 2    spironolactone (ALDACTONE) 25 MG Tab Take 1 Tablet by mouth every day. 30 Tablet 2    insulin detemir (LEVEMIR) 100 UNIT/ML Solution Inject  "60 Units under the skin every day.      DULoxetine (CYMBALTA) 60 MG Cap DR Particles delayed-release capsule Take 60 mg by mouth every day.      gabapentin (NEURONTIN) 300 MG Cap Take 900 mg by mouth 2 times a day.      metformin (GLUCOPHAGE) 1000 MG tablet Take 1,000 mg by mouth 2 times a day with meals.      albuterol 108 (90 Base) MCG/ACT Aero Soln inhalation aerosol Inhale 1-2 Puffs every four hours as needed for Shortness of Breath.       No facility-administered encounter medications on file as of 1/4/2024.     Review of Systems   Constitutional:  Positive for malaise/fatigue. Negative for fever.   Respiratory:  Positive for shortness of breath. Negative for cough.    Cardiovascular:  Positive for chest pain (soreness). Negative for palpitations, orthopnea, claudication, leg swelling and PND.   Gastrointestinal:  Positive for vomiting. Negative for abdominal pain.   Musculoskeletal:  Negative for myalgias.   Neurological:  Negative for dizziness.   All other systems reviewed and are negative.             Objective     /70 (BP Location: Left arm, Patient Position: Sitting, BP Cuff Size: Adult)   Pulse 72   Resp 12   Ht 1.854 m (6' 1\")   Wt 98.9 kg (218 lb)   SpO2 95%   BMI 28.76 kg/m²     Physical Exam  Vitals reviewed.   Constitutional:       Appearance: He is well-developed.   HENT:      Head: Normocephalic and atraumatic.   Eyes:      Pupils: Pupils are equal, round, and reactive to light.   Neck:      Vascular: No JVD.   Cardiovascular:      Rate and Rhythm: Normal rate and regular rhythm.      Heart sounds: Normal heart sounds.   Pulmonary:      Effort: Pulmonary effort is normal. No respiratory distress.      Breath sounds: Normal breath sounds. No wheezing or rales.   Chest:      Chest wall: Tenderness present.   Abdominal:      General: Bowel sounds are normal.      Palpations: Abdomen is soft.   Musculoskeletal:         General: Normal range of motion.      Cervical back: Normal range of " "motion and neck supple.      Right lower leg: No edema.      Left lower leg: No edema.   Skin:     General: Skin is warm and dry.   Neurological:      General: No focal deficit present.      Mental Status: He is alert and oriented to person, place, and time.   Psychiatric:         Behavior: Behavior normal.            No results found for: \"CHOLSTRLTOT\", \"LDL\", \"HDL\", \"TRIGLYCERIDE\"    Lab Results   Component Value Date/Time    SODIUM 136 2023 01:28 AM    POTASSIUM 3.7 2023 01:28 AM    CHLORIDE 100 2023 01:28 AM    CO2 26 2023 01:28 AM    GLUCOSE 151 (H) 2023 01:28 AM    BUN 26 (H) 2023 01:28 AM    CREATININE 1.01 2023 01:28 AM     Lab Results   Component Value Date/Time    ALKPHOSPHAT 64 2023 04:50 AM    ASTSGOT 165 (H) 2023 04:50 AM    ALTSGPT 139 (H) 2023 04:50 AM    TBILIRUBIN 0.3 2023 04:50 AM     Cardiac Catheterization and Percutaneous Intervention Procedure Report 2023     Indication for procedure: Cardiac arrest, acute coronary syndrome     Procedures:  Coronary angiogram  Left heart catheterization  Successful three-vessel PCI as described below     Recommendations: Guideline directed medical therapy and risk factor management     Coronary arteriograms:  Left main: normal  Left anterior descendin% stenosis in midportion, lesion involves bifurcation of large diagonal branch.  Left circumflex: Continues as large marginal branch, which has 95% stenosis in midportion  Right coronary: 95% stenosis distal RCA, RPDA has focal 70% stenosis in midportion, co-dominant     Left Heart Catheterization:  Left Ventriculogram: Not performed  Left Ventricular EDP: 20 mm Hg   Aortic Valve Gradient: No significant AV gradient noted      Transthoracic Echo Report 2023  No prior study is available for comparison.   The ascending aorta diameter is  3.8 cm.  Normal LV size and thickness with reduced LV systolic function,   estimated LVEF 40-45% with " apical akinesis suggestive of underlying   ischemic cardiomyopathy  No LV apical thrombus  Normal RV size and systolic function  No significant valvular abnormalities  No pericardial effusion  Dilated noncollapsible IVC with estimated right atrial pressure of 15   mmHg  Paula CHEUNG notified via Voalte on 8/29/23 at 9:13AM    Date 6MWT ML[facility]F   9/19/2023 Not done due to cardiac arrest 84                        Assessment & Plan     1. ACC/AHA stage B systolic heart failure (HCC)  EC-ECHOCARDIOGRAM COMPLETE W/O CONT      2. Ischemic cardiomyopathy  EC-ECHOCARDIOGRAM COMPLETE W/O CONT      3. Coronary artery disease involving native coronary artery of native heart without angina pectoris  EC-ECHOCARDIOGRAM COMPLETE W/O CONT      4. Heart failure, NYHA class 1 (HCC)        5. S/P drug eluting coronary stent placement        6. High risk medication use        7. Tobacco dependence            Medical Decision Making: Today's Assessment/Status/Plan:        Ischemic cardiomyopathy, HFmrEF, stage B, class 1, LVEF 40 to 45%: He does mention some shortness of breath, but could be due to his recent pneumonia.   -Evidence Based Beta-blocker: Continue metoprolol SR 25 mg daily, patient encouraged to start taking this at night  -ACE-I/ARB/ARNI: Continue losartan 25 mg daily, recommend that patient could also try to take this at night to help with fatigue  -Aldosterone Antagonist: Continue spironolactone 25 mg daily  -Diuretic: not on diuretic at this time   -SGLT2 inhibitor: Continue Farxiga 10 mg daily  -Other: consider as needed (Hydralazine/Isosorbide, Vericiguat, Corlanor)  -Labs: BMP, NT proBNP, patient encouraged to complete lab testing today or tomorrow, plans on completing them in [place] tomorrow, Will continue to closely monitor for side effects of patient's high risk medication(s) including renal function, liver function NTproBNP/cardiac markers, and electrolytes as needed  -discussed importance of exercise and  regular activity  -Discussed/reviewed maintaining a low Sodium and hydration recommendations  -Repeat Echo, currently ICD not indicated, patient to schedule at this time  -Reinforced s/sx of worsening heart failure with patient and weight monitoring. Pt verbalizes understanding. Pt to call office or RTC if present.    -PUMP line number [phone] (PUMP) reviewed with patient  -Heart Failure Education:  Discussed the Definition of heart failure (linking disease, symptoms, and treatment) and causes of heart failure  Patient had formal heart failure education today  Recognition of escalating symptoms and concrete plan for response to particular symptoms  Risk modification for heart failure progression  Specific diet recommendations: Recommend low-sodium diet, adequate hydration.  Patient unable to participate in cardiac rehab, recommend slowly getting back to work  Start monitoring weights at home daily. Call office if weight increasing greater than 3 lbs in 1 day or greater than 5 lbs in 1 week.   Importance of treatment adherence  Behavioral strategies to promote treatment adherence  -Advanced care planning: Advance directive and POLST to be discussed at future visit  -Pharmacotherapy Referral: Not referred as patient lives out of town  -Compliance Barriers: None  -Genetic testing Consideration: None  -Consideration for LVAD and/or Heart transplant as necessary       CAD, CHIOMA x3 to LAD, circumflex and RCA on 8/29/2023:  -Discussed with patient likely his pain is musculoskeletal as he is still healing from his compressions.  -Continue aspirin 81 mg daily  -Continue Effient 10 mg daily, DAPT for 1 year  -Continue atorvastatin 80 mg daily  -On SGLT2 inhibitor  -Patient not able to participate in cardiac rehab due to living out of town  -Recommend complete smoking cessation  -Patient can try some Tylenol for chest wall pain, recommend to avoid NSAIDs     Tobacco dependence:  -Recommend and discussed importance of smoking  cessation    FU in clinic in 1 to 2 months after echo.  Virtual visit okay.  Patient states there is a cardiologist in [place] now and he would like to establish with them, patient to follow-up 1 more time after echo and then we can send a message over to new cardiologist.  Patient to work on getting an appointment with them as well. Sooner if needed.    Patient verbalizes understanding and agrees with the plan of care.     PLEASE NOTE: This Note was created using voice recognition Software. I have made every reasonable attempt to correct obvious errors, but I expect that there are errors of grammar and possibly content that I did not discover before finalizing the note

## 2024-01-04 NOTE — LETTER
January 4, 2024    To Whom It May Concern:         This is confirmation that Bradly Daugherty attended his scheduled appointment with WILLA Whatley on 1/04/24.         If you have any questions please do not hesitate to call me at the phone number listed below.    Sincerely,          TAHIR Whaltey.  [phone]

## 2024-01-04 NOTE — PROGRESS NOTES
Pt and spouse arrived for HF education.     Discussed in great detail dysfunction of heart muscle based on description of systolic versus diastolic versus right-sided.     Provided possible etiology based on patient chart review.    Medication purpose and function in body discussed with great detail. Described acclamation period with new medications and titrations.     Reinforced proper home weight and blood pressure monitoring to include BP 2 hours after medication. Tips provided for symptomatic low blood pressure (8 oz of water, small salty snack, laying down with feet elevated). Provided warning signs of orthostatic hypotension.     Educated on appropriate water versus total fluid intake/restriction as well as how to properly read nutrition levels for salt intake monitoring. Provided visual and verbal understanding of salt versus water intake in our bodies and how it affects HF. Additionally provided detail information of how salt is in all foods naturally at some capacity    Discussed physical activity recommendations including starting slow with gradual increases as improvement occurs. Avoid excessive cardio with high increased HR and blood pressure for extended periods of time. Patient should be able to recover and back to baseline within 30 min.    All questions answered, total time spent with patient 60 min.

## 2024-01-16 DIAGNOSIS — I50.9 HEART FAILURE, NYHA CLASS 2 (HCC): ICD-10-CM

## 2024-01-16 DIAGNOSIS — R06.02 SOB (SHORTNESS OF BREATH): ICD-10-CM

## 2024-05-20 ENCOUNTER — TELEPHONE (OUTPATIENT)
Dept: CARDIOLOGY | Facility: MEDICAL CENTER | Age: 59
End: 2024-05-20
Payer: MEDICARE

## 2024-05-20 NOTE — TELEPHONE ENCOUNTER
Called pt in regards to messages below. Per pt is following up at [facility] but does not remember doctor name. Was just seen last Thursday.

## 2024-05-20 NOTE — TELEPHONE ENCOUNTER
Last OV: 1/4/2024  Proposed Surgery: tooth extractions   Surgery Date: TBD  Requesting Office Name: [facility]   Fax Number: [phone]  Preference of Location (default is surgery center unless specified by Cardiologist or [facility])  Prior Clearance Addressed: No      Anticoags/Antiplatelets: Aspirin and Other Prasugrel   Anticoags/Antiplatelet managed by Cardiology? YES    Outstanding Cardiac Imaging : Yes  Echo.   Clearance to provider to review  Stent, Cardiac Devices, or Catheterization: Yes  Date : 8/29/2023   Ablation, TAVR/Valve (including open heart), Cardioversion: No  Recent Cardiac Hospitalization: Yes  Date:  8/28/2023            When: Greater than 3 months since hospitalization   History (cardiac history):   Past Medical History:   Diagnosis Date    Diabetes (HCC)              Surgical Clearance Letter Sent: No Provider to advise.   **Scan clearance request letter into [facility].**

## 2024-05-20 NOTE — TELEPHONE ENCOUNTER
Thank you, please tell him or tell [facility] that they need to send the clearance request to that provider.

## 2024-05-20 NOTE — TELEPHONE ENCOUNTER
Is patient seeing a cardiologist in [facility], [place], operated by [facility]? Can you find out. He was suppose to see me a couple months ago.

## 2024-05-20 NOTE — TELEPHONE ENCOUNTER
JAIMEE      Caller: Amy    Office Name, phone number, fax number: [facility], fax# [phone]    Fax clearance to [phone]    Procedure Name: Teeth extraction (6 extractions and 4 crowns)    Procedure Scheduled Date: n/a  Their office sent over a clearance to us on 5/8(no in patient's chart) and they sent another over to us today      Callback Number: [phone]    Thank you    -Bill ALONSO

## 2024-05-21 NOTE — TELEPHONE ENCOUNTER
JAIMEE    Caller: Amy     Topic/issue: Dentist office calling back again. Said they spoke with patient and he has not seen another Cardiologist. They are looking specifically for a clearance from a cardiac standpoint and are wondering if we are able to provide it since he has not been established with a new cardiologist at this time. Please advise.     Callback Number: [phone]     Thank you,  Paula ALONSO

## 2024-05-22 NOTE — TELEPHONE ENCOUNTER
Called Amy back from the dental office for clarification on surgical clearance that was needed. Per amy. Clearance was sent to D[facility] and spoke to pt about it.

## 2025-03-21 DIAGNOSIS — I25.110 CORONARY ARTERY DISEASE INVOLVING NATIVE CORONARY ARTERY OF NATIVE HEART WITH UNSTABLE ANGINA PECTORIS (HCC): ICD-10-CM

## 2025-08-18 DIAGNOSIS — I25.110 CORONARY ARTERY DISEASE INVOLVING NATIVE CORONARY ARTERY OF NATIVE HEART WITH UNSTABLE ANGINA PECTORIS (HCC): ICD-10-CM
